# Patient Record
Sex: MALE | URBAN - METROPOLITAN AREA
[De-identification: names, ages, dates, MRNs, and addresses within clinical notes are randomized per-mention and may not be internally consistent; named-entity substitution may affect disease eponyms.]

---

## 2017-08-06 ENCOUNTER — EMERGENCY (EMERGENCY)
Facility: HOSPITAL | Age: 22
LOS: 1 days | Discharge: HOME | End: 2017-08-06
Attending: EMERGENCY MEDICINE
Payer: SELF-PAY

## 2017-08-06 VITALS
HEART RATE: 89 BPM | OXYGEN SATURATION: 98 % | HEIGHT: 66 IN | RESPIRATION RATE: 18 BRPM | DIASTOLIC BLOOD PRESSURE: 78 MMHG | TEMPERATURE: 98 F | SYSTOLIC BLOOD PRESSURE: 123 MMHG | WEIGHT: 149.91 LBS

## 2017-08-06 PROCEDURE — 99053 MED SERV 10PM-8AM 24 HR FAC: CPT

## 2017-08-06 PROCEDURE — 99284 EMERGENCY DEPT VISIT MOD MDM: CPT | Mod: 25
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
## 2017-08-07 VITALS
RESPIRATION RATE: 18 BRPM | DIASTOLIC BLOOD PRESSURE: 71 MMHG | HEART RATE: 81 BPM | SYSTOLIC BLOOD PRESSURE: 127 MMHG | OXYGEN SATURATION: 99 % | TEMPERATURE: 98 F

## 2017-08-07 PROCEDURE — 73030 X-RAY EXAM OF SHOULDER: CPT | Mod: 26,50

## 2017-08-07 PROCEDURE — 73070 X-RAY EXAM OF ELBOW: CPT | Mod: 26,50

## 2017-08-07 RX ORDER — IBUPROFEN 200 MG
600 TABLET ORAL ONCE
Refills: 0 | Status: COMPLETED | OUTPATIENT
Start: 2017-08-07 | End: 2017-08-07

## 2017-08-07 RX ORDER — BACITRACIN ZINC 500 UNIT/G
1 OINTMENT IN PACKET (EA) TOPICAL ONCE
Refills: 0 | Status: COMPLETED | OUTPATIENT
Start: 2017-08-07 | End: 2017-08-07

## 2017-08-07 RX ADMIN — Medication 600 MILLIGRAM(S): at 00:28

## 2017-08-07 RX ADMIN — Medication 1 APPLICATION(S): at 00:28
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
## 2017-11-29 ENCOUNTER — EMERGENCY (EMERGENCY)
Facility: HOSPITAL | Age: 22
LOS: 1 days | Discharge: HOME | End: 2017-11-29
Attending: EMERGENCY MEDICINE | Admitting: EMERGENCY MEDICINE
Payer: SELF-PAY

## 2017-11-29 VITALS
WEIGHT: 149.91 LBS | TEMPERATURE: 98 F | SYSTOLIC BLOOD PRESSURE: 142 MMHG | OXYGEN SATURATION: 99 % | DIASTOLIC BLOOD PRESSURE: 86 MMHG | HEIGHT: 62.99 IN | HEART RATE: 50 BPM | RESPIRATION RATE: 16 BRPM

## 2017-11-29 PROCEDURE — 73630 X-RAY EXAM OF FOOT: CPT | Mod: 26,LT

## 2017-11-29 PROCEDURE — 73630 X-RAY EXAM OF FOOT: CPT

## 2017-11-29 PROCEDURE — 99284 EMERGENCY DEPT VISIT MOD MDM: CPT | Mod: 25

## 2017-11-29 PROCEDURE — 73610 X-RAY EXAM OF ANKLE: CPT | Mod: 26,LT

## 2017-11-29 PROCEDURE — T1013: CPT

## 2017-11-29 PROCEDURE — 29515 APPLICATION SHORT LEG SPLINT: CPT

## 2017-11-29 PROCEDURE — 29515 APPLICATION SHORT LEG SPLINT: CPT | Mod: LT

## 2017-11-29 PROCEDURE — 73700 CT LOWER EXTREMITY W/O DYE: CPT

## 2017-11-29 PROCEDURE — 99285 EMERGENCY DEPT VISIT HI MDM: CPT | Mod: 25

## 2017-11-29 PROCEDURE — 73610 X-RAY EXAM OF ANKLE: CPT

## 2017-11-29 PROCEDURE — 73700 CT LOWER EXTREMITY W/O DYE: CPT | Mod: 26,76,LT

## 2017-11-29 RX ORDER — IBUPROFEN 200 MG
600 TABLET ORAL ONCE
Refills: 0 | Status: COMPLETED | OUTPATIENT
Start: 2017-11-29 | End: 2017-11-29

## 2017-11-29 RX ADMIN — Medication 600 MILLIGRAM(S): at 10:41

## 2017-11-29 NOTE — ED STATDOCS - PROGRESS NOTE DETAILS
Language Services was utilized in obtaining the H & P and throughout the duration of the patient's care.   NP NOTE: Pt seen by intake physician and HPI/ROS/PE/MDM reviewed. Pt seen and evaluated. Discussed plan and any resulted studies at this time. Will continue to monitor and re-evaluate.  Re-Evaluation: pt with persistent left foot pain with difficulty walking. point tenderness to base of great toe and to posterior aspect of foot/ankle. DROM. NVI. xrays neg. discussed splint and ortho fu vs CT with pt, opted for CT, splint, ortho fu. Labs and imaging results reviewed.  Patient advised of results. Anticipatory guidance provided.
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
## 2017-11-29 NOTE — ED STATDOCS - ATTENDING CONTRIBUTION TO CARE
I, Regino Marrufo, performed the initial face to face bedside interview with this patient regarding history of present illness, review of symptoms and relevant past medical, social and family history.  I completed an independent physical examination.  I was the initial provider who evaluated this patient. I have signed out the follow up of any pending tests (i.e. labs, radiological studies) to the ACP.  I have communicated the patient’s plan of care and disposition with the ACP.

## 2017-11-29 NOTE — ED PROCEDURE NOTE - CPROC ED POST PROC CARE GUIDE1
Instructed patient/caregiver regarding signs and symptoms of infection./Verbal/written post procedure instructions were given to patient/caregiver./Instructed patient/caregiver to follow-up with primary care physician./Keep the cast/splint/dressing clean and dry./Elevate the injured extremity as instructed.

## 2017-12-05 PROBLEM — Z00.00 ENCOUNTER FOR PREVENTIVE HEALTH EXAMINATION: Status: ACTIVE | Noted: 2017-12-05

## 2017-12-11 ENCOUNTER — APPOINTMENT (OUTPATIENT)
Dept: ORTHOPEDIC SURGERY | Facility: CLINIC | Age: 22
End: 2017-12-11
Payer: SELF-PAY

## 2017-12-11 VITALS
HEIGHT: 66 IN | TEMPERATURE: 98.7 F | WEIGHT: 160 LBS | BODY MASS INDEX: 25.71 KG/M2 | SYSTOLIC BLOOD PRESSURE: 140 MMHG | HEART RATE: 66 BPM | DIASTOLIC BLOOD PRESSURE: 86 MMHG

## 2017-12-11 DIAGNOSIS — Z78.9 OTHER SPECIFIED HEALTH STATUS: ICD-10-CM

## 2017-12-11 DIAGNOSIS — S93.492A SPRAIN OF OTHER LIGAMENT OF LEFT ANKLE, INITIAL ENCOUNTER: ICD-10-CM

## 2017-12-11 PROCEDURE — 99204 OFFICE O/P NEW MOD 45 MIN: CPT

## 2018-09-13 ENCOUNTER — OTHER (OUTPATIENT)
Age: 23
End: 2018-09-13

## 2019-05-24 ENCOUNTER — EMERGENCY (EMERGENCY)
Facility: HOSPITAL | Age: 24
LOS: 1 days | Discharge: HOME | End: 2019-05-24
Attending: EMERGENCY MEDICINE
Payer: SELF-PAY

## 2019-05-24 VITALS
HEIGHT: 63 IN | HEART RATE: 58 BPM | DIASTOLIC BLOOD PRESSURE: 82 MMHG | TEMPERATURE: 98 F | SYSTOLIC BLOOD PRESSURE: 136 MMHG | RESPIRATION RATE: 18 BRPM | WEIGHT: 149.91 LBS | OXYGEN SATURATION: 99 %

## 2019-05-24 PROCEDURE — 99283 EMERGENCY DEPT VISIT LOW MDM: CPT

## 2019-05-24 PROCEDURE — T1013: CPT

## 2019-05-24 RX ORDER — ERYTHROMYCIN BASE 5 MG/GRAM
1 OINTMENT (GRAM) OPHTHALMIC (EYE)
Qty: 1 | Refills: 0
Start: 2019-05-24 | End: 2019-05-28

## 2019-05-24 RX ORDER — ERYTHROMYCIN BASE 5 MG/GRAM
1 OINTMENT (GRAM) OPHTHALMIC (EYE) ONCE
Refills: 0 | Status: COMPLETED | OUTPATIENT
Start: 2019-05-24 | End: 2019-05-24

## 2019-05-24 RX ADMIN — Medication 1 APPLICATION(S): at 23:23

## 2019-05-24 NOTE — ED PROVIDER NOTE - PHYSICAL EXAMINATION
EYE: 20/20 bilaterally. DUSTIN and eomi bilaterally.   RIGHT EYE: injected sclera and conjunctiva. lid eversion withou fbs noted   RAUL EYE: 20/20 bilaterally. DUSTIN and eomi bilaterally.   RIGHT EYE: injected sclera and conjunctiva. lid eversion withou fbs noted   FLORSCENE: + uptake to 12 region in circular

## 2019-05-24 NOTE — ED PROVIDER NOTE - PROGRESS NOTE DETAILS
patient irrigated at eye wash station educated on ointment and pain drops   educated on fu with eye doctor and NOT SCRATCHING EYE

## 2019-05-24 NOTE — ED PROVIDER NOTE - ATTENDING CONTRIBUTION TO CARE
The patient seen and examined  The patient presents with R eye pain    Cornea Abrasion    I, Evan Ceballos, performed the initial face to face bedside interview with this patient regarding history of present illness, review of symptoms and relevant past medical, social and family history.  I completed an independent physical examination.  I was the initial provider who evaluated this patient. I have signed out the follow up of any pending tests (i.e. labs, radiological studies) to the ACP.  I have communicated the patient’s plan of care and disposition with the ACP.

## 2019-05-24 NOTE — ED PROVIDER NOTE - OBJECTIVE STATEMENT
23 year old male states that he was using a saw to cut plye board today states that a piece flew up and into his right eye. states that he had his brother look for the wood and might have seen a piece states that he has a little blurry vision in the eye. no pain. tetanus was within the last 10 years   no eye glasses

## 2019-12-15 ENCOUNTER — INPATIENT (INPATIENT)
Facility: HOSPITAL | Age: 24
LOS: 4 days | Discharge: NURSING, REHAB OR LONG-TERM CARE | DRG: 24 | End: 2019-12-20
Attending: SURGERY | Admitting: SURGERY
Payer: COMMERCIAL

## 2019-12-15 LAB
ALBUMIN SERPL ELPH-MCNC: 5.1 G/DL — SIGNIFICANT CHANGE UP (ref 3.3–5.2)
ALP SERPL-CCNC: 79 U/L — SIGNIFICANT CHANGE UP (ref 40–120)
ALT FLD-CCNC: 29 U/L — SIGNIFICANT CHANGE UP
ANION GAP SERPL CALC-SCNC: 21 MMOL/L — HIGH (ref 5–17)
APTT BLD: 25.6 SEC — LOW (ref 27.5–36.3)
AST SERPL-CCNC: 36 U/L — SIGNIFICANT CHANGE UP
BASE EXCESS BLDV CALC-SCNC: -7.3 MMOL/L — LOW (ref -2–2)
BASOPHILS # BLD AUTO: 0.07 K/UL — SIGNIFICANT CHANGE UP (ref 0–0.2)
BASOPHILS NFR BLD AUTO: 0.8 % — SIGNIFICANT CHANGE UP (ref 0–2)
BILIRUB SERPL-MCNC: 0.3 MG/DL — LOW (ref 0.4–2)
BLD GP AB SCN SERPL QL: SIGNIFICANT CHANGE UP
BUN SERPL-MCNC: 15 MG/DL — SIGNIFICANT CHANGE UP (ref 8–20)
CA-I SERPL-SCNC: 1.06 MMOL/L — LOW (ref 1.15–1.33)
CALCIUM SERPL-MCNC: 8.9 MG/DL — SIGNIFICANT CHANGE UP (ref 8.6–10.2)
CHLORIDE BLDV-SCNC: 111 MMOL/L — HIGH (ref 98–107)
CHLORIDE SERPL-SCNC: 105 MMOL/L — SIGNIFICANT CHANGE UP (ref 98–107)
CO2 SERPL-SCNC: 16 MMOL/L — LOW (ref 22–29)
CREAT SERPL-MCNC: 0.94 MG/DL — SIGNIFICANT CHANGE UP (ref 0.5–1.3)
EOSINOPHIL # BLD AUTO: 0.07 K/UL — SIGNIFICANT CHANGE UP (ref 0–0.5)
EOSINOPHIL NFR BLD AUTO: 0.8 % — SIGNIFICANT CHANGE UP (ref 0–6)
ETHANOL SERPL-MCNC: 165 MG/DL — SIGNIFICANT CHANGE UP
GAS PNL BLDV: 146 MMOL/L — HIGH (ref 135–145)
GAS PNL BLDV: SIGNIFICANT CHANGE UP
GAS PNL BLDV: SIGNIFICANT CHANGE UP
GLUCOSE BLDV-MCNC: 95 MG/DL — SIGNIFICANT CHANGE UP (ref 70–99)
GLUCOSE SERPL-MCNC: 104 MG/DL — SIGNIFICANT CHANGE UP (ref 70–115)
HCO3 BLDV-SCNC: 19 MMOL/L — LOW (ref 20–26)
HCT VFR BLD CALC: 51.5 % — HIGH (ref 39–50)
HCT VFR BLDA CALC: 60 — HIGH (ref 39–50)
HGB BLD CALC-MCNC: 19.4 G/DL — CRITICAL HIGH (ref 13–17)
HGB BLD-MCNC: 18.3 G/DL — HIGH (ref 13–17)
IMM GRANULOCYTES NFR BLD AUTO: 0.8 % — SIGNIFICANT CHANGE UP (ref 0–1.5)
INR BLD: 0.92 RATIO — SIGNIFICANT CHANGE UP (ref 0.88–1.16)
LACTATE BLDV-MCNC: 4.5 MMOL/L — CRITICAL HIGH (ref 0.5–2)
LYMPHOCYTES # BLD AUTO: 2.83 K/UL — SIGNIFICANT CHANGE UP (ref 1–3.3)
LYMPHOCYTES # BLD AUTO: 32.5 % — SIGNIFICANT CHANGE UP (ref 13–44)
MCHC RBC-ENTMCNC: 31.3 PG — SIGNIFICANT CHANGE UP (ref 27–34)
MCHC RBC-ENTMCNC: 35.5 GM/DL — SIGNIFICANT CHANGE UP (ref 32–36)
MCV RBC AUTO: 88.2 FL — SIGNIFICANT CHANGE UP (ref 80–100)
MONOCYTES # BLD AUTO: 0.36 K/UL — SIGNIFICANT CHANGE UP (ref 0–0.9)
MONOCYTES NFR BLD AUTO: 4.1 % — SIGNIFICANT CHANGE UP (ref 2–14)
NEUTROPHILS # BLD AUTO: 5.32 K/UL — SIGNIFICANT CHANGE UP (ref 1.8–7.4)
NEUTROPHILS NFR BLD AUTO: 61 % — SIGNIFICANT CHANGE UP (ref 43–77)
OTHER CELLS CSF MANUAL: 25 ML/DL — HIGH (ref 18–22)
PCO2 BLDV: 37 MMHG — SIGNIFICANT CHANGE UP (ref 35–50)
PH BLDV: 7.3 — LOW (ref 7.32–7.43)
PLATELET # BLD AUTO: 277 K/UL — SIGNIFICANT CHANGE UP (ref 150–400)
PO2 BLDV: 72 MMHG — HIGH (ref 25–45)
POTASSIUM BLDV-SCNC: 3.9 MMOL/L — SIGNIFICANT CHANGE UP (ref 3.4–4.5)
POTASSIUM SERPL-MCNC: 4.1 MMOL/L — SIGNIFICANT CHANGE UP (ref 3.5–5.3)
POTASSIUM SERPL-SCNC: 4.1 MMOL/L — SIGNIFICANT CHANGE UP (ref 3.5–5.3)
PROT SERPL-MCNC: 8.1 G/DL — SIGNIFICANT CHANGE UP (ref 6.6–8.7)
PROTHROM AB SERPL-ACNC: 10.6 SEC — SIGNIFICANT CHANGE UP (ref 10–12.9)
RBC # BLD: 5.84 M/UL — HIGH (ref 4.2–5.8)
RBC # FLD: 11.6 % — SIGNIFICANT CHANGE UP (ref 10.3–14.5)
SAO2 % BLDV: 93 % — SIGNIFICANT CHANGE UP
SODIUM SERPL-SCNC: 142 MMOL/L — SIGNIFICANT CHANGE UP (ref 135–145)
WBC # BLD: 8.72 K/UL — SIGNIFICANT CHANGE UP (ref 3.8–10.5)
WBC # FLD AUTO: 8.72 K/UL — SIGNIFICANT CHANGE UP (ref 3.8–10.5)

## 2019-12-15 PROCEDURE — 72131 CT LUMBAR SPINE W/O DYE: CPT | Mod: 26

## 2019-12-15 PROCEDURE — 99222 1ST HOSP IP/OBS MODERATE 55: CPT

## 2019-12-15 PROCEDURE — 74177 CT ABD & PELVIS W/CONTRAST: CPT | Mod: 26

## 2019-12-15 PROCEDURE — 72128 CT CHEST SPINE W/O DYE: CPT | Mod: 26

## 2019-12-15 PROCEDURE — 71045 X-RAY EXAM CHEST 1 VIEW: CPT | Mod: 26

## 2019-12-15 PROCEDURE — 71260 CT THORAX DX C+: CPT | Mod: 26

## 2019-12-15 PROCEDURE — 99285 EMERGENCY DEPT VISIT HI MDM: CPT

## 2019-12-15 PROCEDURE — 70486 CT MAXILLOFACIAL W/O DYE: CPT | Mod: 26

## 2019-12-15 PROCEDURE — 70450 CT HEAD/BRAIN W/O DYE: CPT | Mod: 26

## 2019-12-15 PROCEDURE — 70496 CT ANGIOGRAPHY HEAD: CPT | Mod: 26

## 2019-12-15 PROCEDURE — 72125 CT NECK SPINE W/O DYE: CPT | Mod: 26

## 2019-12-15 PROCEDURE — 73090 X-RAY EXAM OF FOREARM: CPT | Mod: 26,RT

## 2019-12-15 RX ORDER — SODIUM CHLORIDE 9 MG/ML
1000 INJECTION INTRAMUSCULAR; INTRAVENOUS; SUBCUTANEOUS
Refills: 0 | Status: DISCONTINUED | OUTPATIENT
Start: 2019-12-15 | End: 2019-12-16

## 2019-12-15 RX ORDER — LEVETIRACETAM 250 MG/1
1000 TABLET, FILM COATED ORAL ONCE
Refills: 0 | Status: COMPLETED | OUTPATIENT
Start: 2019-12-15 | End: 2019-12-15

## 2019-12-15 RX ORDER — HYDROMORPHONE HYDROCHLORIDE 2 MG/ML
0.5 INJECTION INTRAMUSCULAR; INTRAVENOUS; SUBCUTANEOUS EVERY 4 HOURS
Refills: 0 | Status: DISCONTINUED | OUTPATIENT
Start: 2019-12-15 | End: 2019-12-16

## 2019-12-15 RX ORDER — FENTANYL CITRATE 50 UG/ML
50 INJECTION INTRAVENOUS ONCE
Refills: 0 | Status: DISCONTINUED | OUTPATIENT
Start: 2019-12-15 | End: 2019-12-15

## 2019-12-15 RX ORDER — TETANUS TOXOID, REDUCED DIPHTHERIA TOXOID AND ACELLULAR PERTUSSIS VACCINE, ADSORBED 5; 2.5; 8; 8; 2.5 [IU]/.5ML; [IU]/.5ML; UG/.5ML; UG/.5ML; UG/.5ML
0.5 SUSPENSION INTRAMUSCULAR ONCE
Refills: 0 | Status: COMPLETED | OUTPATIENT
Start: 2019-12-15 | End: 2019-12-15

## 2019-12-15 RX ORDER — ACETAMINOPHEN 500 MG
1000 TABLET ORAL ONCE
Refills: 0 | Status: COMPLETED | OUTPATIENT
Start: 2019-12-15 | End: 2019-12-15

## 2019-12-15 RX ORDER — CEFAZOLIN SODIUM 1 G
VIAL (EA) INJECTION
Refills: 0 | Status: DISCONTINUED | OUTPATIENT
Start: 2019-12-15 | End: 2019-12-16

## 2019-12-15 RX ORDER — CALCIUM GLUCONATE 100 MG/ML
1 VIAL (ML) INTRAVENOUS ONCE
Refills: 0 | Status: COMPLETED | OUTPATIENT
Start: 2019-12-15 | End: 2019-12-15

## 2019-12-15 RX ORDER — HYDROMORPHONE HYDROCHLORIDE 2 MG/ML
1 INJECTION INTRAMUSCULAR; INTRAVENOUS; SUBCUTANEOUS EVERY 4 HOURS
Refills: 0 | Status: DISCONTINUED | OUTPATIENT
Start: 2019-12-15 | End: 2019-12-16

## 2019-12-15 RX ORDER — CEFAZOLIN SODIUM 1 G
1000 VIAL (EA) INJECTION ONCE
Refills: 0 | Status: DISCONTINUED | OUTPATIENT
Start: 2019-12-15 | End: 2019-12-16

## 2019-12-15 RX ORDER — CHLORHEXIDINE GLUCONATE 213 G/1000ML
1 SOLUTION TOPICAL
Refills: 0 | Status: DISCONTINUED | OUTPATIENT
Start: 2019-12-15 | End: 2019-12-16

## 2019-12-15 RX ORDER — CEFAZOLIN SODIUM 1 G
2000 VIAL (EA) INJECTION ONCE
Refills: 0 | Status: DISCONTINUED | OUTPATIENT
Start: 2019-12-15 | End: 2019-12-15

## 2019-12-15 RX ORDER — LEVETIRACETAM 250 MG/1
500 TABLET, FILM COATED ORAL EVERY 12 HOURS
Refills: 0 | Status: DISCONTINUED | OUTPATIENT
Start: 2019-12-15 | End: 2019-12-16

## 2019-12-15 RX ADMIN — SODIUM CHLORIDE 1000 MILLILITER(S): 9 INJECTION INTRAMUSCULAR; INTRAVENOUS; SUBCUTANEOUS at 23:30

## 2019-12-15 RX ADMIN — HYDROMORPHONE HYDROCHLORIDE 1 MILLIGRAM(S): 2 INJECTION INTRAMUSCULAR; INTRAVENOUS; SUBCUTANEOUS at 23:30

## 2019-12-15 RX ADMIN — TETANUS TOXOID, REDUCED DIPHTHERIA TOXOID AND ACELLULAR PERTUSSIS VACCINE, ADSORBED 0.5 MILLILITER(S): 5; 2.5; 8; 8; 2.5 SUSPENSION INTRAMUSCULAR at 23:35

## 2019-12-15 RX ADMIN — HYDROMORPHONE HYDROCHLORIDE 1 MILLIGRAM(S): 2 INJECTION INTRAMUSCULAR; INTRAVENOUS; SUBCUTANEOUS at 23:45

## 2019-12-15 RX ADMIN — FENTANYL CITRATE 50 MICROGRAM(S): 50 INJECTION INTRAVENOUS at 23:00

## 2019-12-15 RX ADMIN — Medication 100 MILLIGRAM(S): at 23:30

## 2019-12-15 RX ADMIN — FENTANYL CITRATE 50 MICROGRAM(S): 50 INJECTION INTRAVENOUS at 22:45

## 2019-12-15 NOTE — H&P ADULT - ASSESSMENT
-Keep c-spine collar in place at this time  -NPO w/ IVF  -F/u pending labs and imaging  - Multiple fractures noted to the cephalad, depressed skull fracture  - Neurosurgery evaluation  - Admit to sicu  - Plastics for nasal bone fractures  - obtain plainfilms of right upper and lower extremities
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
## 2019-12-15 NOTE — ED PROVIDER NOTE - SKIN, MLM
3cm laceration to parietal scalp, 3cm to R occipital lobe. abrasion to dorsal aspect of R index finger. laceration to L ear lobe. abrasions to R knee and lateral aspect of L leg

## 2019-12-15 NOTE — H&P ADULT - NSHPPHYSICALEXAM_GEN_ALL_CORE
Constitutional: Well-developed well nourished Male in no acute distress  HEENT: Head with blood from head. 3 cm laceration to the midline parietal region, 4cm laceration to right occipital region, maxillofacial structures stable, no blood or discharge from nares or oral cavity, no gray sign / racoon eyes, EOMI b/l, pupils [ 3]mm round and reactive to light b/l, no active drainage or redness, left ear with .7 cm laceration  Neck: cervical collar in place, trachea midline  Respiratory: Breath sounds CTA b/l respirations are unlabored, no accessory muscle use, no conversational dyspnea  Cardiovascular: Regular rate & rhythm, +S1, S1, Chest wall is non-tender to palpation, no subQ emphysema or crepitus palpated  Gastrointestinal: Abdomen soft, non-tender, non-distended, no rebound tenderness / guarding, no ecchymosis or external signs of abdominal trauma  Musculoskeletal: Unable to move the distal aspect of his right leg, can flex at the level of the knee. no point tenderness or deformity noted to lower extremities b/l. Right upper extremity no gross deformities. 3cm laceration to volar aspect of forearm, 4x5cm hematoma with overlaying abrasion to the ventral aspect of left forearm. 2 areas of .7cm abrasion to palm of left hand. Abrasion to right knee  Pelvis: stable  Vascular: 2+ radial, femoral, and DP pulses b/l  Neurological: GCS: 15 (4/5/6). A&O x 3; no movement right lower extremity distal to the knee.  Musculoskeletal: 5/5 strength of upper and lower extremities b/l  Neuropsinal: no C/LS spine tenderness to palpation, Tender to mid back/distal thoracic region to palpation. no step-offs or signs of external trauma to the back
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
## 2019-12-15 NOTE — H&P ADULT - PROBLEM SELECTOR PLAN 4
Admit to SICU under Trauma service  Neuro checks  Neurosurgery consultation   pain control  IV antibiotics

## 2019-12-15 NOTE — H&P ADULT - PROBLEM SELECTOR PLAN 1
Admit to SICU under Trauma service  Neuro checks  Neurosurgery consultation   pain control  IV antibiotics  Plastic surgery consult
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
## 2019-12-16 ENCOUNTER — RESULT REVIEW (OUTPATIENT)
Age: 24
End: 2019-12-16

## 2019-12-16 VITALS
DIASTOLIC BLOOD PRESSURE: 94 MMHG | OXYGEN SATURATION: 98 % | TEMPERATURE: 99 F | HEART RATE: 95 BPM | RESPIRATION RATE: 20 BRPM | SYSTOLIC BLOOD PRESSURE: 138 MMHG

## 2019-12-16 DIAGNOSIS — S02.91XA UNSPECIFIED FRACTURE OF SKULL, INITIAL ENCOUNTER FOR CLOSED FRACTURE: ICD-10-CM

## 2019-12-16 DIAGNOSIS — S02.2XXA FRACTURE OF NASAL BONES, INITIAL ENCOUNTER FOR CLOSED FRACTURE: ICD-10-CM

## 2019-12-16 DIAGNOSIS — R29.898 OTHER SYMPTOMS AND SIGNS INVOLVING THE MUSCULOSKELETAL SYSTEM: ICD-10-CM
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
## 2019-12-16 DIAGNOSIS — S51.811A LACERATION WITHOUT FOREIGN BODY OF RIGHT FOREARM, INITIAL ENCOUNTER: ICD-10-CM

## 2019-12-16 DIAGNOSIS — S06.5X9A TRAUMATIC SUBDURAL HEMORRHAGE WITH LOSS OF CONSCIOUSNESS OF UNSPECIFIED DURATION, INITIAL ENCOUNTER: ICD-10-CM

## 2019-12-16 DIAGNOSIS — I60.9 NONTRAUMATIC SUBARACHNOID HEMORRHAGE, UNSPECIFIED: ICD-10-CM

## 2019-12-16 DIAGNOSIS — S02.91XB UNSPECIFIED FRACTURE OF SKULL, INITIAL ENCOUNTER FOR OPEN FRACTURE: ICD-10-CM

## 2019-12-16 LAB
ABO RH CONFIRMATION: SIGNIFICANT CHANGE UP
AMPHET UR-MCNC: NEGATIVE — SIGNIFICANT CHANGE UP
ANION GAP SERPL CALC-SCNC: 14 MMOL/L — SIGNIFICANT CHANGE UP (ref 5–17)
BARBITURATES UR SCN-MCNC: NEGATIVE — SIGNIFICANT CHANGE UP
BASOPHILS # BLD AUTO: 0.03 K/UL — SIGNIFICANT CHANGE UP (ref 0–0.2)
BASOPHILS NFR BLD AUTO: 0.3 % — SIGNIFICANT CHANGE UP (ref 0–2)
BENZODIAZ UR-MCNC: NEGATIVE — SIGNIFICANT CHANGE UP
BUN SERPL-MCNC: 9 MG/DL — SIGNIFICANT CHANGE UP (ref 8–20)
CALCIUM SERPL-MCNC: 8.6 MG/DL — SIGNIFICANT CHANGE UP (ref 8.6–10.2)
CHLORIDE SERPL-SCNC: 103 MMOL/L — SIGNIFICANT CHANGE UP (ref 98–107)
CO2 SERPL-SCNC: 21 MMOL/L — LOW (ref 22–29)
COCAINE METAB.OTHER UR-MCNC: NEGATIVE — SIGNIFICANT CHANGE UP
CREAT SERPL-MCNC: 0.62 MG/DL — SIGNIFICANT CHANGE UP (ref 0.5–1.3)
EOSINOPHIL # BLD AUTO: 0.02 K/UL — SIGNIFICANT CHANGE UP (ref 0–0.5)
EOSINOPHIL NFR BLD AUTO: 0.2 % — SIGNIFICANT CHANGE UP (ref 0–6)
GLUCOSE BLDC GLUCOMTR-MCNC: 87 MG/DL — SIGNIFICANT CHANGE UP (ref 70–99)
GLUCOSE BLDC GLUCOMTR-MCNC: 97 MG/DL — SIGNIFICANT CHANGE UP (ref 70–99)
GLUCOSE SERPL-MCNC: 99 MG/DL — SIGNIFICANT CHANGE UP (ref 70–115)
GRAM STN FLD: SIGNIFICANT CHANGE UP
HCT VFR BLD CALC: 45.4 % — SIGNIFICANT CHANGE UP (ref 39–50)
HGB BLD-MCNC: 16 G/DL — SIGNIFICANT CHANGE UP (ref 13–17)
IMM GRANULOCYTES NFR BLD AUTO: 0.3 % — SIGNIFICANT CHANGE UP (ref 0–1.5)
LACTATE SERPL-SCNC: 0.8 MMOL/L — SIGNIFICANT CHANGE UP (ref 0.5–2)
LYMPHOCYTES # BLD AUTO: 1.16 K/UL — SIGNIFICANT CHANGE UP (ref 1–3.3)
LYMPHOCYTES # BLD AUTO: 10.2 % — LOW (ref 13–44)
MAGNESIUM SERPL-MCNC: 2 MG/DL — SIGNIFICANT CHANGE UP (ref 1.6–2.6)
MCHC RBC-ENTMCNC: 31.1 PG — SIGNIFICANT CHANGE UP (ref 27–34)
MCHC RBC-ENTMCNC: 35.2 GM/DL — SIGNIFICANT CHANGE UP (ref 32–36)
MCV RBC AUTO: 88.3 FL — SIGNIFICANT CHANGE UP (ref 80–100)
METHADONE UR-MCNC: NEGATIVE — SIGNIFICANT CHANGE UP
MONOCYTES # BLD AUTO: 0.67 K/UL — SIGNIFICANT CHANGE UP (ref 0–0.9)
MONOCYTES NFR BLD AUTO: 5.9 % — SIGNIFICANT CHANGE UP (ref 2–14)
NEUTROPHILS # BLD AUTO: 9.48 K/UL — HIGH (ref 1.8–7.4)
NEUTROPHILS NFR BLD AUTO: 83.1 % — HIGH (ref 43–77)
OPIATES UR-MCNC: NEGATIVE — SIGNIFICANT CHANGE UP
PCP SPEC-MCNC: SIGNIFICANT CHANGE UP
PCP UR-MCNC: NEGATIVE — SIGNIFICANT CHANGE UP
PHOSPHATE SERPL-MCNC: 2.9 MG/DL — SIGNIFICANT CHANGE UP (ref 2.4–4.7)
PLATELET # BLD AUTO: 219 K/UL — SIGNIFICANT CHANGE UP (ref 150–400)
POTASSIUM SERPL-MCNC: 4.2 MMOL/L — SIGNIFICANT CHANGE UP (ref 3.5–5.3)
POTASSIUM SERPL-SCNC: 4.2 MMOL/L — SIGNIFICANT CHANGE UP (ref 3.5–5.3)
RBC # BLD: 5.14 M/UL — SIGNIFICANT CHANGE UP (ref 4.2–5.8)
RBC # FLD: 11.7 % — SIGNIFICANT CHANGE UP (ref 10.3–14.5)
SODIUM SERPL-SCNC: 138 MMOL/L — SIGNIFICANT CHANGE UP (ref 135–145)
SPECIMEN SOURCE: SIGNIFICANT CHANGE UP
THC UR QL: NEGATIVE — SIGNIFICANT CHANGE UP
WBC # BLD: 11.39 K/UL — HIGH (ref 3.8–10.5)
WBC # FLD AUTO: 11.39 K/UL — HIGH (ref 3.8–10.5)

## 2019-12-16 PROCEDURE — 99291 CRITICAL CARE FIRST HOUR: CPT | Mod: 25

## 2019-12-16 PROCEDURE — 88311 DECALCIFY TISSUE: CPT | Mod: 26

## 2019-12-16 PROCEDURE — 93010 ELECTROCARDIOGRAM REPORT: CPT

## 2019-12-16 PROCEDURE — 70450 CT HEAD/BRAIN W/O DYE: CPT | Mod: 26

## 2019-12-16 PROCEDURE — 99252 IP/OBS CONSLTJ NEW/EST SF 35: CPT

## 2019-12-16 PROCEDURE — 99232 SBSQ HOSP IP/OBS MODERATE 35: CPT

## 2019-12-16 PROCEDURE — 62140 CRNOP SKULL DEFECT<5 CM DIAM: CPT | Mod: 80

## 2019-12-16 PROCEDURE — 73110 X-RAY EXAM OF WRIST: CPT | Mod: 26,RT

## 2019-12-16 PROCEDURE — 88304 TISSUE EXAM BY PATHOLOGIST: CPT | Mod: 26

## 2019-12-16 PROCEDURE — 71045 X-RAY EXAM CHEST 1 VIEW: CPT | Mod: 26

## 2019-12-16 PROCEDURE — 62005 TREAT SKULL FRACTURE: CPT

## 2019-12-16 PROCEDURE — 62005 TREAT SKULL FRACTURE: CPT | Mod: 80

## 2019-12-16 PROCEDURE — 62140 CRNOP SKULL DEFECT<5 CM DIAM: CPT

## 2019-12-16 PROCEDURE — 36558 INSERT TUNNELED CV CATH: CPT

## 2019-12-16 RX ORDER — SODIUM CHLORIDE 9 MG/ML
1000 INJECTION, SOLUTION INTRAVENOUS ONCE
Refills: 0 | Status: COMPLETED | OUTPATIENT
Start: 2019-12-16 | End: 2019-12-16

## 2019-12-16 RX ORDER — CEFAZOLIN SODIUM 1 G
2000 VIAL (EA) INJECTION ONCE
Refills: 0 | Status: COMPLETED | OUTPATIENT
Start: 2019-12-16 | End: 2019-12-15

## 2019-12-16 RX ORDER — FENTANYL CITRATE 50 UG/ML
12.5 INJECTION INTRAVENOUS ONCE
Refills: 0 | Status: DISCONTINUED | OUTPATIENT
Start: 2019-12-16 | End: 2019-12-16

## 2019-12-16 RX ORDER — CEFAZOLIN SODIUM 1 G
1000 VIAL (EA) INJECTION EVERY 8 HOURS
Refills: 0 | Status: DISCONTINUED | OUTPATIENT
Start: 2019-12-16 | End: 2019-12-16

## 2019-12-16 RX ORDER — ACETAMINOPHEN 500 MG
650 TABLET ORAL EVERY 6 HOURS
Refills: 0 | Status: DISCONTINUED | OUTPATIENT
Start: 2019-12-16 | End: 2019-12-17

## 2019-12-16 RX ORDER — SODIUM CHLORIDE 9 MG/ML
1000 INJECTION INTRAMUSCULAR; INTRAVENOUS; SUBCUTANEOUS ONCE
Refills: 0 | Status: COMPLETED | OUTPATIENT
Start: 2019-12-16 | End: 2019-12-15

## 2019-12-16 RX ORDER — SODIUM CHLORIDE 9 MG/ML
1000 INJECTION INTRAMUSCULAR; INTRAVENOUS; SUBCUTANEOUS
Refills: 0 | Status: DISCONTINUED | OUTPATIENT
Start: 2019-12-16 | End: 2019-12-17

## 2019-12-16 RX ORDER — CEFAZOLIN SODIUM 1 G
1000 VIAL (EA) INJECTION EVERY 8 HOURS
Refills: 0 | Status: DISCONTINUED | OUTPATIENT
Start: 2019-12-16 | End: 2019-12-17

## 2019-12-16 RX ORDER — BACITRACIN ZINC 500 UNIT/G
1 OINTMENT IN PACKET (EA) TOPICAL DAILY
Refills: 0 | Status: DISCONTINUED | OUTPATIENT
Start: 2019-12-16 | End: 2019-12-16

## 2019-12-16 RX ORDER — HYDROMORPHONE HYDROCHLORIDE 2 MG/ML
0.5 INJECTION INTRAMUSCULAR; INTRAVENOUS; SUBCUTANEOUS ONCE
Refills: 0 | Status: DISCONTINUED | OUTPATIENT
Start: 2019-12-16 | End: 2019-12-16

## 2019-12-16 RX ORDER — SODIUM CHLORIDE 9 MG/ML
1000 INJECTION, SOLUTION INTRAVENOUS
Refills: 0 | Status: DISCONTINUED | OUTPATIENT
Start: 2019-12-16 | End: 2019-12-16

## 2019-12-16 RX ORDER — LEVETIRACETAM 250 MG/1
500 TABLET, FILM COATED ORAL
Refills: 0 | Status: DISCONTINUED | OUTPATIENT
Start: 2019-12-16 | End: 2019-12-18

## 2019-12-16 RX ADMIN — CHLORHEXIDINE GLUCONATE 1 APPLICATION(S): 213 SOLUTION TOPICAL at 07:01

## 2019-12-16 RX ADMIN — Medication 100 GRAM(S): at 01:31

## 2019-12-16 RX ADMIN — LEVETIRACETAM 420 MILLIGRAM(S): 250 TABLET, FILM COATED ORAL at 08:50

## 2019-12-16 RX ADMIN — SODIUM CHLORIDE 150 MILLILITER(S): 9 INJECTION INTRAMUSCULAR; INTRAVENOUS; SUBCUTANEOUS at 08:50

## 2019-12-16 RX ADMIN — Medication 100 MILLIGRAM(S): at 16:15

## 2019-12-16 RX ADMIN — Medication 1000 MILLIGRAM(S): at 01:05

## 2019-12-16 RX ADMIN — HYDROMORPHONE HYDROCHLORIDE 0.5 MILLIGRAM(S): 2 INJECTION INTRAMUSCULAR; INTRAVENOUS; SUBCUTANEOUS at 07:46

## 2019-12-16 RX ADMIN — FENTANYL CITRATE 12.5 MICROGRAM(S): 50 INJECTION INTRAVENOUS at 14:48

## 2019-12-16 RX ADMIN — HYDROMORPHONE HYDROCHLORIDE 0.5 MILLIGRAM(S): 2 INJECTION INTRAMUSCULAR; INTRAVENOUS; SUBCUTANEOUS at 08:11

## 2019-12-16 RX ADMIN — LEVETIRACETAM 400 MILLIGRAM(S): 250 TABLET, FILM COATED ORAL at 01:21

## 2019-12-16 RX ADMIN — Medication 100 MILLIGRAM(S): at 09:13

## 2019-12-16 RX ADMIN — Medication 100 MILLIGRAM(S): at 23:31

## 2019-12-16 RX ADMIN — SODIUM CHLORIDE 1000 MILLILITER(S): 9 INJECTION, SOLUTION INTRAVENOUS at 03:40

## 2019-12-16 RX ADMIN — HYDROMORPHONE HYDROCHLORIDE 0.5 MILLIGRAM(S): 2 INJECTION INTRAMUSCULAR; INTRAVENOUS; SUBCUTANEOUS at 20:54

## 2019-12-16 RX ADMIN — FENTANYL CITRATE 12.5 MICROGRAM(S): 50 INJECTION INTRAVENOUS at 15:00

## 2019-12-16 RX ADMIN — Medication 400 MILLIGRAM(S): at 00:50

## 2019-12-16 RX ADMIN — HYDROMORPHONE HYDROCHLORIDE 0.5 MILLIGRAM(S): 2 INJECTION INTRAMUSCULAR; INTRAVENOUS; SUBCUTANEOUS at 23:50

## 2019-12-16 RX ADMIN — HYDROMORPHONE HYDROCHLORIDE 0.5 MILLIGRAM(S): 2 INJECTION INTRAMUSCULAR; INTRAVENOUS; SUBCUTANEOUS at 22:22

## 2019-12-16 RX ADMIN — HYDROMORPHONE HYDROCHLORIDE 0.5 MILLIGRAM(S): 2 INJECTION INTRAMUSCULAR; INTRAVENOUS; SUBCUTANEOUS at 23:31

## 2019-12-16 NOTE — PROGRESS NOTE ADULT - SUBJECTIVE AND OBJECTIVE BOX
Neurosurgery   Pre-Procedure Note PA-C      Procedure: This is a 24 year old right hand dominant Male for repair of depressed skull fracture with Dr. Velez today.   Anesthesia plan- general  Abx- on ancef  NPO  labs reviewed see below  Imaging reviewed see below  Type and screen valid  Consent to be obtained by surgeon      LABS                 18.3   8.72   )----------(  277       ( 15 Dec 2019 22:44 )               51.5     PT/INR -  10.6 sec / 0.92 ratio   ( 15 Dec 2019 22:44 )     PTT -  25.6 sec   ( 15 Dec 2019 22:44 )  CAPILLARY BLOOD GLUCOSE    Type + Screen (12.15.19 @ 22:52)    ABO RH Interpretation: A POS        22:43 - VBG - pH: 7.30  | pCO2: 37    | pO2: 72    | Lactate: 4.5      < from: CT Chest w/ IV Cont (12.15.19 @ 23:00) >  IMPRESSION:     No CT evidence of acute traumatic injury to the chest, abdomen, or pelvis.    No acute thoracic or lumbar spine fracture or evidence of traumatic   malalignment.    < end of copied text >  < from: CT Angio Head w/ IV Cont (12.15.19 @ 23:47) >  Brain: Extra-axial hemorrhage overlying the superior parasagittal left  parietal   lobe measuring up to approximately 8 mm in depth, no apparent active   extravasation of contrast to suggest active hemorrhaging. Small amount of   subdural hemorrhage along left lateral margin of the anterior to mid   falx. Mild   pneumocephalus.   Ventricles: Mild inferior displacement of the posterior body of the left   lateral ventricle and left ventricular atrium.   Bones/joints: Superior left parasagittal comminuted calvarial fracture   with up   to approximately 9 mm bony depression. Bony fragments approximate the   left   lateral margin of the superior sagittal sinus and impresses the medial   extent   of several superficial cerebral veins. Right and left lateral nasal wall   fractures, fracture of the left maxillary frontal process and diastasis   at the   right nasal maxillary suture.   Sinuses: Mild opacification of ethmoid air cells.   Soft tissues: Superior scalp swelling and bilateral scalp staples.     < end of copied text >

## 2019-12-16 NOTE — CHART NOTE - NSCHARTNOTEFT_GEN_A_CORE
I reviewed ct scan of the cervical spine, was negative for any injury. I was able to clear the c-spine with the help of  Odette. He was able to cooperate with all my instructions and had no pain with movement of his neck. He remains a GCS 15, A&Ox3, and currently his pain is controlled. I also reassured him that we are here to take care of him and that he is in a safe place. His brother was present at the bedside, security checked his ID to make sure that his name matched what the patient provided to the .

## 2019-12-16 NOTE — PROGRESS NOTE ADULT - SUBJECTIVE AND OBJECTIVE BOX
24 HOUR EVENTS:  Patient admitted to SICU overnight, still awaiting ICU bed. Patient remained neurologically intact.    SUBJECTIVE/ROS:  [x] A ten-point review of systems was otherwise negative except as noted.  [ ] Due to altered mental status/intubation, subjective information were not able to be obtained from the patient. History was obtained, to the extent possible, from review of the chart and collateral sources of information.      NEURO  Exam: GCS 15  Meds: HYDROmorphone  Injectable 0.5 milliGRAM(s) IV Push every 4 hours PRN Moderate Pain (4 - 6)  HYDROmorphone  Injectable 1 milliGRAM(s) IV Push every 4 hours PRN Severe Pain (7 - 10)  levETIRAcetam  IVPB 500 milliGRAM(s) IV Intermittent every 12 hours    [x] Adequacy of sedation and pain control has been assessed and adjusted      RESPIRATORY  RR: 20 (12-16-19 @ 02:15) (20 - 20)  SpO2: 98% (12-16-19 @ 02:15) (98% - 98%)  Wt(kg): --  Exam: unlabored, clear to auscultation bilaterally  Mechanical Ventilation:     [ ] Extubation Readiness Assessed  Meds:       CARDIOVASCULAR  HR: 95 (12-16-19 @ 02:15) (95 - 95)  BP: 138/94 (12-16-19 @ 02:15) (138/94 - 138/94)  BP(mean): --  ABP: --  ABP(mean): --  Wt(kg): --  CVP(cm H2O): --  VBG - ( 15 Dec 2019 22:43 )  pH: 7.30  /  pCO2: 37    /  pO2: 72    / HCO3: 19    / Base Excess: -7.3  /  SaO2: 93     Lactate: 4.5                Exam:  Cardiac Rhythm: NSR  Perfusion     [x]Adequate   [ ]Inadequate  Mentation   [c]Normal       [ ]Reduced  Extremities  [c]Warm         [ ]Cool  Meds:       GI/NUTRITION  Exam: abd soft  Diet: NPO  Meds:     GENITOURINARY  I&O's Detail      12-15    142  |  105  |  15.0  ----------------------------<  104  4.1   |  16.0<L>  |  0.94    Ca    8.9      15 Dec 2019 22:44    TPro  8.1  /  Alb  5.1  /  TBili  0.3<L>  /  DBili  x   /  AST  36  /  ALT  29  /  AlkPhos  79  12-15    [ ] Irizarry catheter, indication: N/A  Meds: sodium chloride 0.9%. 1000 milliLiter(s) IV Continuous <Continuous>        HEMATOLOGIC  Meds:   [x] VTE Prophylaxis                        18.3   8.72  )-----------( 277      ( 15 Dec 2019 22:44 )             51.5     PT/INR - ( 15 Dec 2019 22:44 )   PT: 10.6 sec;   INR: 0.92 ratio         PTT - ( 15 Dec 2019 22:44 )  PTT:25.6 sec      INFECTIOUS DISEASES  T(C): 37.1 (12-16-19 @ 02:15), Max: 37.1 (12-16-19 @ 02:15)  Wt(kg): --  WBC Count: 8.72 K/uL (12-15 @ 22:44)    Recent Cultures:    Meds: ceFAZolin   IVPB 1000 milliGRAM(s) IV Intermittent every 8 hours        ENDOCRINE  Capillary Blood Glucose    Meds:       ACCESS DEVICES:  [x] Peripheral IV      OTHER MEDICATIONS:  BACItracin   Ointment 1 Application(s) Topical daily  chlorhexidine 2% Cloths 1 Application(s) Topical <User Schedule>      CODE STATUS: Full code
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
## 2019-12-16 NOTE — PROCEDURE NOTE - NSLAC2DEPTH_SKIN_A_CORE
----- Message from Merlyn Haynes sent at 6/14/2017 11:52 AM CDT -----  Contact: self  Patient needs refills on pain meds   subcutaneous

## 2019-12-16 NOTE — PROGRESS NOTE ADULT - SUBJECTIVE AND OBJECTIVE BOX
NEUROSURGERY POST OP NOTE:       POST OP REPAIR DEPRESSED SKULL FRACTURE.   Patient states left leg weak, unable to move ankles and toes, sensory diminished left side left chest to toes same as pre op.   pt c/o + headache  DARIEL to bulb suction     MEDICATIONS  (STANDING):  ceFAZolin   IVPB 1000 milliGRAM(s) IV Intermittent every 8 hours  levETIRAcetam 500 milliGRAM(s) Oral two times a day  sodium chloride 0.9%. 1000 milliLiter(s) (85 mL/Hr) IV Continuous <Continuous>    MEDICATIONS  (PRN):  acetaminophen   Tablet .. 650 milliGRAM(s) Oral every 6 hours PRN Temp greater or equal to 38C (100.4F), Moderate Pain (4 - 6)  HYDROmorphone  Injectable 0.5 milliGRAM(s) IV Push once PRN Severe Pain (7 - 10)            Vital Signs Last 24 Hrs  T(C): 37.4 (16 Dec 2019 18:30), Max: 37.4 (16 Dec 2019 18:30)  T(F): 99.3 (16 Dec 2019 18:30), Max: 99.3 (16 Dec 2019 18:30)  HR: 77 (16 Dec 2019 20:00) (62 - 95)  BP: 137/79 (16 Dec 2019 20:00) (112/67 - 140/87)  BP(mean): 103 (16 Dec 2019 20:00) (64 - 109)  RR: 24 (16 Dec 2019 20:00) (12 - 35)  SpO2: 99% (16 Dec 2019 20:00) (95% - 100%)    PHYSICAL EXAM:  GENERAL: NAD, well-groomed, well-developed  HEAD: Cranial dressing intact,  Normocephalic  EYES: EOMI, PERRLA, conjunctiva and sclera clear  ENMT:  Moist mucous membranes  NECK: Supple, No JVD  NERVOUS SYSTEM:  Alert & Oriented X3, Good concentration;   Motor Strength 5/5 B/L upper   LLE 5/5 ALL,  RLE: hip and knee 5-/5, ankle and toes 0/5,   diminished sensory right side chest to toes   CHEST/LUNG: Clear  bilaterally; No rales, rhonchi, wheezing, or rubs  HEART: Regular rate   ABDOMEN: Soft, Nontender, Nondistended; Bowel sounds hypoactive   EXTREMITIES:  2+ Peripheral Pulses, No clubbing, cyanosis, or edema  LYMPH: No lymphadenopathy noted  SKIN: No rashes or lesions      LABS:                        16.0   11.39 )-----------( 219      ( 16 Dec 2019 09:25 )             45.4     12-16    138  |  103  |  9.0  ----------------------------<  99  4.2   |  21.0<L>  |  0.62    Ca    8.6      16 Dec 2019 09:25  Phos  2.9     12-16  Mg     2.0     12-16    TPro  8.1  /  Alb  5.1  /  TBili  0.3<L>  /  DBili  x   /  AST  36  /  ALT  29  /  AlkPhos  79  12-15    PT/INR - ( 15 Dec 2019 22:44 )   PT: 10.6 sec;   INR: 0.92 ratio         PTT - ( 15 Dec 2019 22:44 )  PTT:25.6 sec

## 2019-12-16 NOTE — PROGRESS NOTE ADULT - ASSESSMENT
IMP:  POST OP CRANIOPLASTY FOR DEPRESSED SKULL FX  NEURO EXAM STABLE FROM PRE OP AS PER PATIENT WITH RLE WEAKNESS, NO DORSEFLEXTION OR PLANTARFLEXION, 0/5 TOES,  DIMINISHED SENSORY RIGHT SIDE CHEST TO TOES.       PLAN:  ANCEF IV Q8H  NEURO CKS Q1H  SBP<150  POST OP CT HEAD PENDING

## 2019-12-16 NOTE — PROCEDURE NOTE - NSSITEPREP_SKIN_A_CORE
povidone iodine (if allergic to chlorhexidine)
chlorhexidine/Adherence to aseptic technique: hand hygiene prior to donning barriers (gown, gloves), don cap and mask, sterile drape over patient
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
## 2019-12-16 NOTE — PROCEDURE NOTE - NSPOSTPRCRAD_GEN_A_CORE
depth of insertion/central line located in the superior vena cava/post-procedure radiography performed/17 cm/no pneumothorax
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
## 2019-12-17 ENCOUNTER — TRANSCRIPTION ENCOUNTER (OUTPATIENT)
Age: 24
End: 2019-12-17

## 2019-12-17 LAB
ANION GAP SERPL CALC-SCNC: 12 MMOL/L — SIGNIFICANT CHANGE UP (ref 5–17)
BASOPHILS # BLD AUTO: 0.03 K/UL — SIGNIFICANT CHANGE UP (ref 0–0.2)
BASOPHILS NFR BLD AUTO: 0.3 % — SIGNIFICANT CHANGE UP (ref 0–2)
BLD GP AB SCN SERPL QL: SIGNIFICANT CHANGE UP
BUN SERPL-MCNC: 9 MG/DL — SIGNIFICANT CHANGE UP (ref 8–20)
CALCIUM SERPL-MCNC: 8.9 MG/DL — SIGNIFICANT CHANGE UP (ref 8.6–10.2)
CHLORIDE SERPL-SCNC: 104 MMOL/L — SIGNIFICANT CHANGE UP (ref 98–107)
CO2 SERPL-SCNC: 22 MMOL/L — SIGNIFICANT CHANGE UP (ref 22–29)
CREAT SERPL-MCNC: 0.67 MG/DL — SIGNIFICANT CHANGE UP (ref 0.5–1.3)
EOSINOPHIL # BLD AUTO: 0 K/UL — SIGNIFICANT CHANGE UP (ref 0–0.5)
EOSINOPHIL NFR BLD AUTO: 0 % — SIGNIFICANT CHANGE UP (ref 0–6)
GLUCOSE SERPL-MCNC: 112 MG/DL — SIGNIFICANT CHANGE UP (ref 70–115)
HCT VFR BLD CALC: 42.4 % — SIGNIFICANT CHANGE UP (ref 39–50)
HGB BLD-MCNC: 14.5 G/DL — SIGNIFICANT CHANGE UP (ref 13–17)
IMM GRANULOCYTES NFR BLD AUTO: 0.5 % — SIGNIFICANT CHANGE UP (ref 0–1.5)
LYMPHOCYTES # BLD AUTO: 0.97 K/UL — LOW (ref 1–3.3)
LYMPHOCYTES # BLD AUTO: 9.7 % — LOW (ref 13–44)
MAGNESIUM SERPL-MCNC: 1.9 MG/DL — SIGNIFICANT CHANGE UP (ref 1.8–2.6)
MCHC RBC-ENTMCNC: 30.8 PG — SIGNIFICANT CHANGE UP (ref 27–34)
MCHC RBC-ENTMCNC: 34.2 GM/DL — SIGNIFICANT CHANGE UP (ref 32–36)
MCV RBC AUTO: 90 FL — SIGNIFICANT CHANGE UP (ref 80–100)
MONOCYTES # BLD AUTO: 0.72 K/UL — SIGNIFICANT CHANGE UP (ref 0–0.9)
MONOCYTES NFR BLD AUTO: 7.2 % — SIGNIFICANT CHANGE UP (ref 2–14)
NEUTROPHILS # BLD AUTO: 8.28 K/UL — HIGH (ref 1.8–7.4)
NEUTROPHILS NFR BLD AUTO: 82.3 % — HIGH (ref 43–77)
OSMOLALITY SERPL: 293 MOSMOL/KG — SIGNIFICANT CHANGE UP (ref 275–300)
PHOSPHATE SERPL-MCNC: 3.6 MG/DL — SIGNIFICANT CHANGE UP (ref 2.4–4.7)
PLATELET # BLD AUTO: 206 K/UL — SIGNIFICANT CHANGE UP (ref 150–400)
POTASSIUM SERPL-MCNC: 4.3 MMOL/L — SIGNIFICANT CHANGE UP (ref 3.5–5.3)
POTASSIUM SERPL-SCNC: 4.3 MMOL/L — SIGNIFICANT CHANGE UP (ref 3.5–5.3)
RBC # BLD: 4.71 M/UL — SIGNIFICANT CHANGE UP (ref 4.2–5.8)
RBC # FLD: 11.6 % — SIGNIFICANT CHANGE UP (ref 10.3–14.5)
SODIUM SERPL-SCNC: 138 MMOL/L — SIGNIFICANT CHANGE UP (ref 135–145)
WBC # BLD: 10.05 K/UL — SIGNIFICANT CHANGE UP (ref 3.8–10.5)
WBC # FLD AUTO: 10.05 K/UL — SIGNIFICANT CHANGE UP (ref 3.8–10.5)

## 2019-12-17 PROCEDURE — 70450 CT HEAD/BRAIN W/O DYE: CPT | Mod: 26

## 2019-12-17 PROCEDURE — 70450 CT HEAD/BRAIN W/O DYE: CPT | Mod: 26,77

## 2019-12-17 PROCEDURE — 99232 SBSQ HOSP IP/OBS MODERATE 35: CPT

## 2019-12-17 PROCEDURE — 73562 X-RAY EXAM OF KNEE 3: CPT | Mod: 26,RT

## 2019-12-17 PROCEDURE — 99233 SBSQ HOSP IP/OBS HIGH 50: CPT | Mod: 24

## 2019-12-17 PROCEDURE — 73060 X-RAY EXAM OF HUMERUS: CPT | Mod: 26,RT

## 2019-12-17 PROCEDURE — 73590 X-RAY EXAM OF LOWER LEG: CPT | Mod: 26,RT

## 2019-12-17 PROCEDURE — 73552 X-RAY EXAM OF FEMUR 2/>: CPT | Mod: 26,RT

## 2019-12-17 PROCEDURE — 73610 X-RAY EXAM OF ANKLE: CPT | Mod: 26,RT

## 2019-12-17 PROCEDURE — 73200 CT UPPER EXTREMITY W/O DYE: CPT | Mod: 26,RT

## 2019-12-17 PROCEDURE — 73620 X-RAY EXAM OF FOOT: CPT | Mod: 26,RT

## 2019-12-17 PROCEDURE — 99222 1ST HOSP IP/OBS MODERATE 55: CPT

## 2019-12-17 PROCEDURE — 73130 X-RAY EXAM OF HAND: CPT | Mod: 26,RT

## 2019-12-17 RX ORDER — CEFAZOLIN SODIUM 1 G
2000 VIAL (EA) INJECTION EVERY 8 HOURS
Refills: 0 | Status: DISCONTINUED | OUTPATIENT
Start: 2019-12-17 | End: 2019-12-18

## 2019-12-17 RX ORDER — ACETAMINOPHEN 500 MG
650 TABLET ORAL EVERY 6 HOURS
Refills: 0 | Status: DISCONTINUED | OUTPATIENT
Start: 2019-12-17 | End: 2019-12-18

## 2019-12-17 RX ORDER — OXYCODONE HYDROCHLORIDE 5 MG/1
10 TABLET ORAL EVERY 6 HOURS
Refills: 0 | Status: DISCONTINUED | OUTPATIENT
Start: 2019-12-17 | End: 2019-12-18

## 2019-12-17 RX ORDER — GABAPENTIN 400 MG/1
300 CAPSULE ORAL EVERY 8 HOURS
Refills: 0 | Status: DISCONTINUED | OUTPATIENT
Start: 2019-12-17 | End: 2019-12-18

## 2019-12-17 RX ORDER — BACITRACIN ZINC 500 UNIT/G
1 OINTMENT IN PACKET (EA) TOPICAL EVERY 12 HOURS
Refills: 0 | Status: DISCONTINUED | OUTPATIENT
Start: 2019-12-17 | End: 2019-12-18

## 2019-12-17 RX ORDER — OXYCODONE HYDROCHLORIDE 5 MG/1
5 TABLET ORAL EVERY 4 HOURS
Refills: 0 | Status: DISCONTINUED | OUTPATIENT
Start: 2019-12-17 | End: 2019-12-18

## 2019-12-17 RX ORDER — ENOXAPARIN SODIUM 100 MG/ML
40 INJECTION SUBCUTANEOUS DAILY
Refills: 0 | Status: DISCONTINUED | OUTPATIENT
Start: 2019-12-17 | End: 2019-12-18

## 2019-12-17 RX ORDER — MAGNESIUM SULFATE 500 MG/ML
2 VIAL (ML) INJECTION ONCE
Refills: 0 | Status: COMPLETED | OUTPATIENT
Start: 2019-12-17 | End: 2019-12-17

## 2019-12-17 RX ADMIN — GABAPENTIN 300 MILLIGRAM(S): 400 CAPSULE ORAL at 22:02

## 2019-12-17 RX ADMIN — Medication 50 GRAM(S): at 05:58

## 2019-12-17 RX ADMIN — OXYCODONE HYDROCHLORIDE 5 MILLIGRAM(S): 5 TABLET ORAL at 06:58

## 2019-12-17 RX ADMIN — OXYCODONE HYDROCHLORIDE 10 MILLIGRAM(S): 5 TABLET ORAL at 09:45

## 2019-12-17 RX ADMIN — Medication 650 MILLIGRAM(S): at 06:58

## 2019-12-17 RX ADMIN — OXYCODONE HYDROCHLORIDE 10 MILLIGRAM(S): 5 TABLET ORAL at 23:53

## 2019-12-17 RX ADMIN — LEVETIRACETAM 500 MILLIGRAM(S): 250 TABLET, FILM COATED ORAL at 05:58

## 2019-12-17 RX ADMIN — LEVETIRACETAM 500 MILLIGRAM(S): 250 TABLET, FILM COATED ORAL at 17:45

## 2019-12-17 RX ADMIN — Medication 100 MILLIGRAM(S): at 05:58

## 2019-12-17 RX ADMIN — Medication 1 APPLICATION(S): at 17:52

## 2019-12-17 RX ADMIN — Medication 100 MILLIGRAM(S): at 22:34

## 2019-12-17 RX ADMIN — OXYCODONE HYDROCHLORIDE 10 MILLIGRAM(S): 5 TABLET ORAL at 23:14

## 2019-12-17 RX ADMIN — Medication 650 MILLIGRAM(S): at 12:00

## 2019-12-17 RX ADMIN — OXYCODONE HYDROCHLORIDE 5 MILLIGRAM(S): 5 TABLET ORAL at 05:58

## 2019-12-17 RX ADMIN — Medication 100 MILLIGRAM(S): at 13:40

## 2019-12-17 RX ADMIN — Medication 650 MILLIGRAM(S): at 11:35

## 2019-12-17 RX ADMIN — SODIUM CHLORIDE 85 MILLILITER(S): 9 INJECTION INTRAMUSCULAR; INTRAVENOUS; SUBCUTANEOUS at 05:58

## 2019-12-17 RX ADMIN — OXYCODONE HYDROCHLORIDE 10 MILLIGRAM(S): 5 TABLET ORAL at 16:20

## 2019-12-17 RX ADMIN — Medication 650 MILLIGRAM(S): at 19:06

## 2019-12-17 RX ADMIN — Medication 650 MILLIGRAM(S): at 05:58

## 2019-12-17 RX ADMIN — ENOXAPARIN SODIUM 40 MILLIGRAM(S): 100 INJECTION SUBCUTANEOUS at 13:40

## 2019-12-17 RX ADMIN — GABAPENTIN 300 MILLIGRAM(S): 400 CAPSULE ORAL at 13:40

## 2019-12-17 RX ADMIN — OXYCODONE HYDROCHLORIDE 10 MILLIGRAM(S): 5 TABLET ORAL at 08:45

## 2019-12-17 RX ADMIN — OXYCODONE HYDROCHLORIDE 10 MILLIGRAM(S): 5 TABLET ORAL at 15:23

## 2019-12-17 RX ADMIN — Medication 650 MILLIGRAM(S): at 17:45

## 2019-12-17 NOTE — PROGRESS NOTE ADULT - ASSESSMENT
Pt. requires resting AFO and interface socks.  Device to provide neutral positioning, reduce risk of pressure and ulceration to heel, reduce risk of contracture, socks to prevent skin breakdown.
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
## 2019-12-17 NOTE — PROGRESS NOTE ADULT - SUBJECTIVE AND OBJECTIVE BOX
HISTORY  24y Male    24 HOUR EVENTS: 12/16 OR for depressed skull fractures. Lacs repaired, C-collar cleared.    SUBJECTIVE/ROS:  [x ] A ten-point review of systems was otherwise negative except as noted.  [ ] Due to altered mental status/intubation, subjective information were not able to be obtained from the patient. History was obtained, to the extent possible, from review of the chart and collateral sources of information.      NEURO  RASS:     GCS: 15    CAM ICU:  Exam: RLE weakness and paresthesias  Meds: acetaminophen   Tablet .. 650 milliGRAM(s) Oral every 6 hours  levETIRAcetam 500 milliGRAM(s) Oral two times a day  oxyCODONE    IR 5 milliGRAM(s) Oral every 4 hours PRN Moderate Pain (4 - 6)    [x] Adequacy of sedation and pain control has been assessed and adjusted      RESPIRATORY  RR: 17 (12-17-19 @ 06:00) (15 - 35)  SpO2: 96% (12-17-19 @ 06:00) (94% - 100%)  Wt(kg): 68kg  Exam: unlabored, clear to auscultation bilaterally  Mechanical Ventilation:   ABG - ( 16 Dec 2019 09:26 )  pH: x     /  pCO2: x     /  pO2: x     / HCO3: x     / Base Excess: x     /  SaO2: x       Lactate: 0.8              [x ] Extubation Readiness Assessed  Meds:         CARDIOVASCULAR  HR: 63 (12-17-19 @ 06:00) (61 - 80)  BP: 112/64 (12-17-19 @ 06:00) (112/64 - 140/87)  BP(mean): 82 (12-17-19 @ 06:00) (77 - 109)  ABP: 112/64  ABP(mean):   Wt(kg): 68kg  CVP(cm H2O): --  VBG - ( 15 Dec 2019 22:43 )  pH: 7.30  /  pCO2: 37    /  pO2: 72    / HCO3: 19    / Base Excess: -7.3  /  SaO2: 93     Lactate: 4.5              Lactate, Blood: 0.8 mmol/L (12-16 @ 09:26)    Exam:  Cardiac Rhythm:  Perfusion     [ x]Adequate   [ ]Inadequate  Mentation   [ x]Normal       [ ]Reduced  Extremities  [x ]Warm         [ ]Cool  Volume Status [ ]Hypervolemic [x ]Euvolemic [ ]Hypovolemic  Meds:       GI/NUTRITION  Exam: wnl  Diet: advance diet at tolerated  Meds:     GENITOURINARY  I&O's Detail    12-16 @ 07:01  -  12-17 @ 07:00  --------------------------------------------------------  IN:    sodium chloride 0.9%: 1050 mL    sodium chloride 0.9%.: 765 mL    Solution: 100 mL    Solution: 150 mL  Total IN: 2065 mL    OUT:    Indwelling Catheter - Urethral: 2585 mL  Total OUT: 2585 mL    Total NET: -520 mL        Weight (kg): 68.2 (12-16 @ 18:47)  12-17    138  |  104  |  9.0  ----------------------------<  112  4.3   |  22.0  |  0.67    Ca    8.9      17 Dec 2019 04:47  Phos  3.6     12-17  Mg     1.9     12-17    TPro  8.1  /  Alb  5.1  /  TBili  0.3<L>  /  DBili  x   /  AST  36  /  ALT  29  /  AlkPhos  79  12-15    [ ] Irizarry catheter, indication: N/A  Meds: sodium chloride 0.9%. 1000 milliLiter(s) IV Continuous <Continuous>        HEMATOLOGIC  Meds:   [x] VTE Prophylaxis                        14.5   10.05 )-----------( 206      ( 17 Dec 2019 04:47 )             42.4     PT/INR - ( 15 Dec 2019 22:44 )   PT: 10.6 sec;   INR: 0.92 ratio         PTT - ( 15 Dec 2019 22:44 )  PTT:25.6 sec  Transfusion     [ ] PRBC   [ ] Platelets   [ ] FFP   [ ] Cryoprecipitate      INFECTIOUS DISEASES  T(C): 37.1 (12-17-19 @ 04:00), Max: 37.4 (12-16-19 @ 18:30)  Wt(kg): --  WBC Count: 10.05 K/uL (12-17 @ 04:47)  WBC Count: 11.39 K/uL (12-16 @ 09:25)    Recent Cultures:  Specimen Source: .Surgical Swab left depressed skull (swabs), 12-16 @ 20:16; Results --; Gram Stain:   No WBC's seen.  No organisms seen; Organism: --    Meds: ceFAZolin   IVPB 1000 milliGRAM(s) IV Intermittent every 8 hours        ENDOCRINE  Capillary Blood Glucose    Meds:       ACCESS DEVICES:  [ ] Peripheral IV  [ ] Central Venous Line	[ ] R	[ ] L	[ ] IJ	[ ] Fem	[ ] SC	Placed:   [ ] Arterial Line		[ ] R	[ ] L	[ ] Fem	[ ] Rad	[ ] Ax	Placed:   [ ] PICC:					[ ] Mediport  [ ] Urinary Catheter, Date Placed:   [ ] Necessity of urinary, arterial, and venous catheters discussed    OTHER MEDICATIONS:      CODE STATUS:     IMAGING:    ____ minutes of critical care time spent providing medical care for patient's acute illness/conditions that impairs at least one vital organ system and/or poses a high risk of imminent or life threatening deterioration in the patient's condition. It includes time spent evaluating and treating the patient's acute illness as well as time spent reviewing labs, radiology, discussing goals of care with patient and/or patient's family, and discussing the case with a multidisciplinary team in an effort to prevent further life threatening deterioration or end organ damage. This time is independent of any procedures performed. HISTORY  24y Male    24 HOUR EVENTS: 12/16 OR for depressed skull fractures. Lacs repaired, C-collar cleared, central line placement.    SUBJECTIVE/ROS:  [x ] A ten-point review of systems was otherwise negative except as noted.  [ ] Due to altered mental status/intubation, subjective information were not able to be obtained from the patient. History was obtained, to the extent possible, from review of the chart and collateral sources of information.      NEURO  RASS:     GCS: 15    CAM ICU:  Exam: RLE weakness and paresthesias  Meds: acetaminophen   Tablet .. 650 milliGRAM(s) Oral every 6 hours  levETIRAcetam 500 milliGRAM(s) Oral two times a day  oxyCODONE    IR 5 milliGRAM(s) Oral every 4 hours PRN Moderate Pain (4 - 6)    [x] Adequacy of sedation and pain control has been assessed and adjusted      RESPIRATORY  RR: 17 (12-17-19 @ 06:00) (15 - 35)  SpO2: 96% (12-17-19 @ 06:00) (94% - 100%)  Wt(kg): 68kg  Exam: unlabored, clear to auscultation bilaterally  Mechanical Ventilation:   ABG - ( 16 Dec 2019 09:26 )  pH: x     /  pCO2: x     /  pO2: x     / HCO3: x     / Base Excess: x     /  SaO2: x       Lactate: 0.8              [x ] Extubation Readiness Assessed  Meds:         CARDIOVASCULAR  HR: 63 (12-17-19 @ 06:00) (61 - 80)  BP: 112/64 (12-17-19 @ 06:00) (112/64 - 140/87)  BP(mean): 82 (12-17-19 @ 06:00) (77 - 109)  ABP: 112/64  ABP(mean):   Wt(kg): 68kg  CVP(cm H2O): --  VBG - ( 15 Dec 2019 22:43 )  pH: 7.30  /  pCO2: 37    /  pO2: 72    / HCO3: 19    / Base Excess: -7.3  /  SaO2: 93     Lactate: 4.5              Lactate, Blood: 0.8 mmol/L (12-16 @ 09:26)    Exam:  Cardiac Rhythm:  Perfusion     [ x]Adequate   [ ]Inadequate  Mentation   [ x]Normal       [ ]Reduced  Extremities  [x ]Warm         [ ]Cool  Volume Status [ ]Hypervolemic [x ]Euvolemic [ ]Hypovolemic  Meds:       GI/NUTRITION  Exam: wnl  Diet: advance diet at tolerated  Meds:     GENITOURINARY  I&O's Detail    12-16 @ 07:01  -  12-17 @ 07:00  --------------------------------------------------------  IN:    sodium chloride 0.9%: 1050 mL    sodium chloride 0.9%.: 765 mL    Solution: 100 mL    Solution: 150 mL  Total IN: 2065 mL    OUT:    Indwelling Catheter - Urethral: 2585 mL  Total OUT: 2585 mL    Total NET: -520 mL        Weight (kg): 68.2 (12-16 @ 18:47)  12-17    138  |  104  |  9.0  ----------------------------<  112  4.3   |  22.0  |  0.67    Ca    8.9      17 Dec 2019 04:47  Phos  3.6     12-17  Mg     1.9     12-17    TPro  8.1  /  Alb  5.1  /  TBili  0.3<L>  /  DBili  x   /  AST  36  /  ALT  29  /  AlkPhos  79  12-15    [x ] Irizarry catheter, indication: N/A  Meds: sodium chloride 0.9%. 1000 milliLiter(s) IV Continuous <Continuous>        HEMATOLOGIC  Meds:   [x] VTE Prophylaxis                        14.5   10.05 )-----------( 206      ( 17 Dec 2019 04:47 )             42.4     PT/INR - ( 15 Dec 2019 22:44 )   PT: 10.6 sec;   INR: 0.92 ratio         PTT - ( 15 Dec 2019 22:44 )  PTT:25.6 sec  Transfusion     [ ] PRBC   [ ] Platelets   [ ] FFP   [ ] Cryoprecipitate      INFECTIOUS DISEASES  T(C): 37.1 (12-17-19 @ 04:00), Max: 37.4 (12-16-19 @ 18:30)  Wt(kg): --  WBC Count: 10.05 K/uL (12-17 @ 04:47)  WBC Count: 11.39 K/uL (12-16 @ 09:25)    Recent Cultures:  Specimen Source: .Surgical Swab left depressed skull (swabs), 12-16 @ 20:16; Results --; Gram Stain:   No WBC's seen.  No organisms seen; Organism: --      Meds: ceFAZolin   IVPB 1000 milliGRAM(s) IV Intermittent every 8 hours        ENDOCRINE  Capillary Blood Glucose    Meds:   MEDICATIONS  (STANDING):  acetaminophen   Tablet .. 650 milliGRAM(s) Oral every 6 hours  ceFAZolin   IVPB 1000 milliGRAM(s) IV Intermittent every 8 hours  levETIRAcetam 500 milliGRAM(s) Oral two times a day  sodium chloride 0.9%. 1000 milliLiter(s) (85 mL/Hr) IV Continuous <Continuous>      ACCESS DEVICES:  [x ] Peripheral IV  [ ] Central Venous Line	[ x] R	[ ] L	[ ] IJ	[ ] Fem	[ ] SC	Placed:   [ ] Arterial Line		[ ] R	[ ] L	[ ] Fem	[ ] Rad	[ ] Ax	Placed:   [ ] PICC:					[ ] Mediport  [x ] Urinary Catheter, Date Placed:   [ ] Necessity of urinary, arterial, and venous catheters discussed    OTHER MEDICATIONS:      CODE STATUS: full    IMAGING:    ____ minutes of critical care time spent providing medical care for patient's acute illness/conditions that impairs at least one vital organ system and/or poses a high risk of imminent or life threatening deterioration in the patient's condition. It includes time spent evaluating and treating the patient's acute illness as well as time spent reviewing labs, radiology, discussing goals of care with patient and/or patient's family, and discussing the case with a multidisciplinary team in an effort to prevent further life threatening deterioration or end organ damage. This time is independent of any procedures performed.

## 2019-12-17 NOTE — PHYSICAL THERAPY INITIAL EVALUATION ADULT - ASR WT BEARING STATUS EVAL
Pt kept pt. NWB right UE 2/2 scaphoid fx. Spoke with Alec MARS and requested order placed for NWB right UE. PA in agreement pt. can use platform RW. Will place in orders
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
## 2019-12-17 NOTE — PROGRESS NOTE ADULT - PROBLEM SELECTOR PLAN 1
Fit/dispensed PRAFO and interface socks.  Pt. to use as directed by   Please call   [facility] with any questions, [phone]
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
## 2019-12-17 NOTE — OCCUPATIONAL THERAPY INITIAL EVALUATION ADULT - DIAGNOSIS, OT EVAL
Left parietal cranioplasty secondary to skull fx; Right scaphoid fx and chronic avulsion of ulnar styloid tip; bilat nasal fxs

## 2019-12-18 ENCOUNTER — TRANSCRIPTION ENCOUNTER (OUTPATIENT)
Age: 24
End: 2019-12-18

## 2019-12-18 LAB
ANION GAP SERPL CALC-SCNC: 12 MMOL/L — SIGNIFICANT CHANGE UP (ref 5–17)
BASOPHILS # BLD AUTO: 0.03 K/UL — SIGNIFICANT CHANGE UP (ref 0–0.2)
BASOPHILS NFR BLD AUTO: 0.4 % — SIGNIFICANT CHANGE UP (ref 0–2)
BUN SERPL-MCNC: 11 MG/DL — SIGNIFICANT CHANGE UP (ref 8–20)
CALCIUM SERPL-MCNC: 8.9 MG/DL — SIGNIFICANT CHANGE UP (ref 8.6–10.2)
CHLORIDE SERPL-SCNC: 103 MMOL/L — SIGNIFICANT CHANGE UP (ref 98–107)
CO2 SERPL-SCNC: 23 MMOL/L — SIGNIFICANT CHANGE UP (ref 22–29)
CREAT SERPL-MCNC: 0.74 MG/DL — SIGNIFICANT CHANGE UP (ref 0.5–1.3)
EOSINOPHIL # BLD AUTO: 0.21 K/UL — SIGNIFICANT CHANGE UP (ref 0–0.5)
EOSINOPHIL NFR BLD AUTO: 2.7 % — SIGNIFICANT CHANGE UP (ref 0–6)
GLUCOSE SERPL-MCNC: 89 MG/DL — SIGNIFICANT CHANGE UP (ref 70–115)
HCT VFR BLD CALC: 42.5 % — SIGNIFICANT CHANGE UP (ref 39–50)
HGB BLD-MCNC: 14.4 G/DL — SIGNIFICANT CHANGE UP (ref 13–17)
IMM GRANULOCYTES NFR BLD AUTO: 0.4 % — SIGNIFICANT CHANGE UP (ref 0–1.5)
LYMPHOCYTES # BLD AUTO: 2.06 K/UL — SIGNIFICANT CHANGE UP (ref 1–3.3)
LYMPHOCYTES # BLD AUTO: 26.3 % — SIGNIFICANT CHANGE UP (ref 13–44)
MAGNESIUM SERPL-MCNC: 2.1 MG/DL — SIGNIFICANT CHANGE UP (ref 1.8–2.6)
MCHC RBC-ENTMCNC: 30.6 PG — SIGNIFICANT CHANGE UP (ref 27–34)
MCHC RBC-ENTMCNC: 33.9 GM/DL — SIGNIFICANT CHANGE UP (ref 32–36)
MCV RBC AUTO: 90.2 FL — SIGNIFICANT CHANGE UP (ref 80–100)
MONOCYTES # BLD AUTO: 0.83 K/UL — SIGNIFICANT CHANGE UP (ref 0–0.9)
MONOCYTES NFR BLD AUTO: 10.6 % — SIGNIFICANT CHANGE UP (ref 2–14)
NEUTROPHILS # BLD AUTO: 4.66 K/UL — SIGNIFICANT CHANGE UP (ref 1.8–7.4)
NEUTROPHILS NFR BLD AUTO: 59.6 % — SIGNIFICANT CHANGE UP (ref 43–77)
PHOSPHATE SERPL-MCNC: 2.7 MG/DL — SIGNIFICANT CHANGE UP (ref 2.4–4.7)
PLATELET # BLD AUTO: 196 K/UL — SIGNIFICANT CHANGE UP (ref 150–400)
POTASSIUM SERPL-MCNC: 3.6 MMOL/L — SIGNIFICANT CHANGE UP (ref 3.5–5.3)
POTASSIUM SERPL-SCNC: 3.6 MMOL/L — SIGNIFICANT CHANGE UP (ref 3.5–5.3)
RBC # BLD: 4.71 M/UL — SIGNIFICANT CHANGE UP (ref 4.2–5.8)
RBC # FLD: 11.8 % — SIGNIFICANT CHANGE UP (ref 10.3–14.5)
SODIUM SERPL-SCNC: 138 MMOL/L — SIGNIFICANT CHANGE UP (ref 135–145)
WBC # BLD: 7.82 K/UL — SIGNIFICANT CHANGE UP (ref 3.8–10.5)
WBC # FLD AUTO: 7.82 K/UL — SIGNIFICANT CHANGE UP (ref 3.8–10.5)

## 2019-12-18 PROCEDURE — 99231 SBSQ HOSP IP/OBS SF/LOW 25: CPT

## 2019-12-18 PROCEDURE — 99232 SBSQ HOSP IP/OBS MODERATE 35: CPT

## 2019-12-18 RX ORDER — ACETAMINOPHEN 500 MG
650 TABLET ORAL EVERY 6 HOURS
Refills: 0 | Status: DISCONTINUED | OUTPATIENT
Start: 2019-12-18 | End: 2019-12-20

## 2019-12-18 RX ORDER — GABAPENTIN 400 MG/1
300 CAPSULE ORAL EVERY 8 HOURS
Refills: 0 | Status: DISCONTINUED | OUTPATIENT
Start: 2019-12-18 | End: 2019-12-20

## 2019-12-18 RX ORDER — ONDANSETRON 8 MG/1
4 TABLET, FILM COATED ORAL ONCE
Refills: 0 | Status: DISCONTINUED | OUTPATIENT
Start: 2019-12-18 | End: 2019-12-19

## 2019-12-18 RX ORDER — CEFAZOLIN SODIUM 1 G
2000 VIAL (EA) INJECTION EVERY 8 HOURS
Refills: 0 | Status: DISCONTINUED | OUTPATIENT
Start: 2019-12-18 | End: 2019-12-20

## 2019-12-18 RX ORDER — VANCOMYCIN HCL 1 G
1000 VIAL (EA) INTRAVENOUS ONCE
Refills: 0 | Status: DISCONTINUED | OUTPATIENT
Start: 2019-12-18 | End: 2019-12-18

## 2019-12-18 RX ORDER — SODIUM CHLORIDE 9 MG/ML
1000 INJECTION, SOLUTION INTRAVENOUS
Refills: 0 | Status: DISCONTINUED | OUTPATIENT
Start: 2019-12-18 | End: 2019-12-19

## 2019-12-18 RX ORDER — BACITRACIN ZINC 500 UNIT/G
1 OINTMENT IN PACKET (EA) TOPICAL EVERY 12 HOURS
Refills: 0 | Status: DISCONTINUED | OUTPATIENT
Start: 2019-12-18 | End: 2019-12-20

## 2019-12-18 RX ORDER — FENTANYL CITRATE 50 UG/ML
25 INJECTION INTRAVENOUS
Refills: 0 | Status: DISCONTINUED | OUTPATIENT
Start: 2019-12-18 | End: 2019-12-19

## 2019-12-18 RX ORDER — OXYCODONE HYDROCHLORIDE 5 MG/1
5 TABLET ORAL EVERY 4 HOURS
Refills: 0 | Status: DISCONTINUED | OUTPATIENT
Start: 2019-12-18 | End: 2019-12-20

## 2019-12-18 RX ORDER — ENOXAPARIN SODIUM 100 MG/ML
40 INJECTION SUBCUTANEOUS DAILY
Refills: 0 | Status: DISCONTINUED | OUTPATIENT
Start: 2019-12-18 | End: 2019-12-20

## 2019-12-18 RX ORDER — LEVETIRACETAM 250 MG/1
500 TABLET, FILM COATED ORAL
Refills: 0 | Status: DISCONTINUED | OUTPATIENT
Start: 2019-12-18 | End: 2019-12-20

## 2019-12-18 RX ORDER — OXYCODONE HYDROCHLORIDE 5 MG/1
10 TABLET ORAL EVERY 6 HOURS
Refills: 0 | Status: DISCONTINUED | OUTPATIENT
Start: 2019-12-18 | End: 2019-12-20

## 2019-12-18 RX ORDER — POTASSIUM CHLORIDE 20 MEQ
10 PACKET (EA) ORAL
Refills: 0 | Status: COMPLETED | OUTPATIENT
Start: 2019-12-18 | End: 2019-12-18

## 2019-12-18 RX ADMIN — Medication 650 MILLIGRAM(S): at 11:58

## 2019-12-18 RX ADMIN — Medication 650 MILLIGRAM(S): at 11:27

## 2019-12-18 RX ADMIN — GABAPENTIN 300 MILLIGRAM(S): 400 CAPSULE ORAL at 05:31

## 2019-12-18 RX ADMIN — FENTANYL CITRATE 25 MICROGRAM(S): 50 INJECTION INTRAVENOUS at 22:31

## 2019-12-18 RX ADMIN — Medication 1 APPLICATION(S): at 06:35

## 2019-12-18 RX ADMIN — OXYCODONE HYDROCHLORIDE 5 MILLIGRAM(S): 5 TABLET ORAL at 17:50

## 2019-12-18 RX ADMIN — Medication 100 MILLIEQUIVALENT(S): at 06:19

## 2019-12-18 RX ADMIN — Medication 100 MILLIGRAM(S): at 14:07

## 2019-12-18 RX ADMIN — SODIUM CHLORIDE 100 MILLILITER(S): 9 INJECTION, SOLUTION INTRAVENOUS at 22:37

## 2019-12-18 RX ADMIN — LEVETIRACETAM 500 MILLIGRAM(S): 250 TABLET, FILM COATED ORAL at 05:31

## 2019-12-18 RX ADMIN — LEVETIRACETAM 500 MILLIGRAM(S): 250 TABLET, FILM COATED ORAL at 22:42

## 2019-12-18 RX ADMIN — Medication 650 MILLIGRAM(S): at 05:31

## 2019-12-18 RX ADMIN — GABAPENTIN 300 MILLIGRAM(S): 400 CAPSULE ORAL at 14:07

## 2019-12-18 RX ADMIN — Medication 100 MILLIEQUIVALENT(S): at 07:46

## 2019-12-18 RX ADMIN — Medication 650 MILLIGRAM(S): at 06:25

## 2019-12-18 RX ADMIN — LEVETIRACETAM 500 MILLIGRAM(S): 250 TABLET, FILM COATED ORAL at 17:50

## 2019-12-18 RX ADMIN — GABAPENTIN 300 MILLIGRAM(S): 400 CAPSULE ORAL at 22:36

## 2019-12-18 RX ADMIN — Medication 650 MILLIGRAM(S): at 00:07

## 2019-12-18 RX ADMIN — Medication 100 MILLIGRAM(S): at 05:28

## 2019-12-18 RX ADMIN — Medication 100 MILLIGRAM(S): at 22:32

## 2019-12-18 RX ADMIN — FENTANYL CITRATE 25 MICROGRAM(S): 50 INJECTION INTRAVENOUS at 22:45

## 2019-12-18 RX ADMIN — FENTANYL CITRATE 25 MICROGRAM(S): 50 INJECTION INTRAVENOUS at 22:55

## 2019-12-18 RX ADMIN — Medication 1 APPLICATION(S): at 17:52

## 2019-12-18 RX ADMIN — Medication 100 MILLIEQUIVALENT(S): at 08:50

## 2019-12-18 RX ADMIN — FENTANYL CITRATE 25 MICROGRAM(S): 50 INJECTION INTRAVENOUS at 22:56

## 2019-12-18 RX ADMIN — Medication 650 MILLIGRAM(S): at 00:55

## 2019-12-18 RX ADMIN — OXYCODONE HYDROCHLORIDE 10 MILLIGRAM(S): 5 TABLET ORAL at 22:46

## 2019-12-18 RX ADMIN — Medication 650 MILLIGRAM(S): at 17:50

## 2019-12-18 NOTE — DISCHARGE NOTE PROVIDER - NSDCCPTREATMENT_GEN_ALL_CORE_FT
PRINCIPAL PROCEDURE  Procedure: Craniectomy, with calvarial bone graft application  Findings and Treatment:       SECONDARY PROCEDURE  Procedure: Open reduction of simple fracture of nasal bone  Findings and Treatment:
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
## 2019-12-18 NOTE — PROGRESS NOTE ADULT - SUBJECTIVE AND OBJECTIVE BOX
HISTORY  24y Male    24 HOUR EVENTS: Scaphoid fracture, thumb spica placed. Passed TOV. Diet advanced. Central line removed. No acute events.     SUBJECTIVE/ROS:  [x] A ten-point review of systems was otherwise negative except as noted.  [ ] Due to altered mental status/intubation, subjective information were not able to be obtained from the patient. History was obtained, to the extent possible, from review of the chart and collateral sources of information.      NEURO  RASS: 0    GCS: 15    CAM ICU: neg  Exam: gcs 15  Meds: acetaminophen   Tablet .. 650 milliGRAM(s) Oral every 6 hours  gabapentin 300 milliGRAM(s) Oral every 8 hours  levETIRAcetam 500 milliGRAM(s) Oral two times a day  oxyCODONE    IR 10 milliGRAM(s) Oral every 6 hours PRN Severe Pain (7 - 10)  oxyCODONE    IR 5 milliGRAM(s) Oral every 4 hours PRN Moderate Pain (4 - 6)    [x] Adequacy of sedation and pain control has been assessed and adjusted      RESPIRATORY  RR: 14 (12-18-19 @ 06:00) (14 - 26)  SpO2: 97% (12-18-19 @ 06:00) (95% - 100%)  Wt(kg): 68  Exam: unlabored, clear to auscultation bilaterally  Mechanical Ventilation:   ABG - ( 16 Dec 2019 09:26 )  pH: x     /  pCO2: x     /  pO2: x     / HCO3: x     / Base Excess: x     /  SaO2: x       Lactate: 0.8              [ ] Extubation Readiness Assessed  Meds:       CARDIOVASCULAR  HR: 56 (12-18-19 @ 06:00) (49 - 78)  BP: 109/58 (12-18-19 @ 06:00) (101/55 - 125/68)  BP(mean): 79 (12-18-19 @ 06:00) (73 - 93)  ABP: --  ABP(mean): --  Wt(kg): --  CVP(cm H2O): --      Exam:  Cardiac Rhythm:  Perfusion     [x ]Adequate   [ ]Inadequate  Mentation   [x ]Normal       [ ]Reduced  Extremities  [x ]Warm         [ ]Cool  Volume Status [ ]Hypervolemic [x ]Euvolemic [ ]Hypovolemic  Meds:       GI/NUTRITION  Exam:  Diet:  Meds:     GENITOURINARY  I&O's Detail    12-17 @ 07:01  -  12-18 @ 07:00  --------------------------------------------------------  IN:    Oral Fluid: 240 mL    sodium chloride 0.9%: 170 mL    Solution: 100 mL    Solution: 100 mL  Total IN: 610 mL    OUT:    Indwelling Catheter - Urethral: 585 mL    Voided: 1800 mL  Total OUT: 2385 mL    Total NET: -1775 mL          12-18    138  |  103  |  11.0  ----------------------------<  89  3.6   |  23.0  |  0.74    Ca    8.9      18 Dec 2019 04:51  Phos  2.7     12-18  Mg     2.1     12-18      [ ] Irizarry catheter, indication: N/A  Meds: potassium chloride  10 mEq/100 mL IVPB 10 milliEquivalent(s) IV Intermittent every 1 hour        HEMATOLOGIC  Meds:   [x] VTE Prophylaxis                        14.4   7.82  )-----------( 196      ( 18 Dec 2019 04:51 )             42.5       Transfusion     [ ] PRBC   [ ] Platelets   [ ] FFP   [ ] Cryoprecipitate      INFECTIOUS DISEASES  T(C): 36.8 (12-18-19 @ 03:52), Max: 37.2 (12-18-19 @ 00:02)  Wt(kg): 68  WBC Count: 7.82 K/uL (12-18 @ 04:51)    Recent Cultures:  Specimen Source: .Surgical Swab left depressed skull (swabs), 12-16 @ 20:16; Results   No growth at 1 day.  Culture in progress; Gram Stain:   No WBC's seen.  No organisms seen; Organism: --    Meds: ceFAZolin   IVPB 2000 milliGRAM(s) IV Intermittent every 8 hours        ENDOCRINE  Capillary Blood Glucose    Meds:       ACCESS DEVICES:  [x] Peripheral IV  [ ] Central Venous Line	[ ] R	[ ] L	[ ] IJ	[ ] Fem	[ ] SC	Placed:   [ ] Arterial Line		[ ] R	[ ] L	[ ] Fem	[ ] Rad	[ ] Ax	Placed:   [ ] PICC:					[ ] Mediport  [ ] Urinary Catheter, Date Placed:   [ ] Necessity of urinary, arterial, and venous catheters discussed    OTHER MEDICATIONS:  BACItracin   Ointment 1 Application(s) Topical every 12 hours      CODE STATUS:     IMAGING:    ____ minutes of critical care time spent providing medical care for patient's acute illness/conditions that impairs at least one vital organ system and/or poses a high risk of imminent or life threatening deterioration in the patient's condition. It includes time spent evaluating and treating the patient's acute illness as well as time spent reviewing labs, radiology, discussing goals of care with patient and/or patient's family, and discussing the case with a multidisciplinary team in an effort to prevent further life threatening deterioration or end organ damage. This time is independent of any procedures performed. HISTORY  24y Male    24 HOUR EVENTS: Scaphoid fracture, thumb spica placed. Passed TOV. Diet advanced. Central line removed. No acute events.     SUBJECTIVE/ROS:  [x] A ten-point review of systems was otherwise negative except as noted.  [ ] Due to altered mental status/intubation, subjective information were not able to be obtained from the patient. History was obtained, to the extent possible, from review of the chart and collateral sources of information.      NEURO  RASS: 0    GCS: 15    CAM ICU: neg  Exam: gcs 15  Meds: acetaminophen   Tablet .. 650 milliGRAM(s) Oral every 6 hours  gabapentin 300 milliGRAM(s) Oral every 8 hours  levETIRAcetam 500 milliGRAM(s) Oral two times a day  oxyCODONE    IR 10 milliGRAM(s) Oral every 6 hours PRN Severe Pain (7 - 10)  oxyCODONE    IR 5 milliGRAM(s) Oral every 4 hours PRN Moderate Pain (4 - 6)    [x] Adequacy of sedation and pain control has been assessed and adjusted      RESPIRATORY  RR: 14 (12-18-19 @ 06:00) (14 - 26)  SpO2: 97% (12-18-19 @ 06:00) (95% - 100%)  Wt(kg): 68  Exam: unlabored, clear to auscultation bilaterally  Mechanical Ventilation:   ABG - ( 16 Dec 2019 09:26 )  pH: x     /  pCO2: x     /  pO2: x     / HCO3: x     / Base Excess: x     /  SaO2: x       Lactate: 0.8              [ ] Extubation Readiness Assessed  Meds:       CARDIOVASCULAR  HR: 56 (12-18-19 @ 06:00) (49 - 78)  BP: 109/58 (12-18-19 @ 06:00) (101/55 - 125/68)  BP(mean): 79 (12-18-19 @ 06:00) (73 - 93)  ABP: 109/58  ABP(mean): --  Wt(kg): 68  CVP(cm H2O): --      Exam:  Cardiac Rhythm:  Perfusion     [x ]Adequate   [ ]Inadequate  Mentation   [x ]Normal       [ ]Reduced  Extremities  [x ]Warm         [ ]Cool  Volume Status [ ]Hypervolemic [x ]Euvolemic [ ]Hypovolemic  Meds:       GI/NUTRITION  Exam: wnl  Diet: NPO after midnight  Meds:     GENITOURINARY  I&O's Detail    12-17 @ 07:01  -  12-18 @ 07:00  --------------------------------------------------------  IN:    Oral Fluid: 240 mL    sodium chloride 0.9%: 170 mL    Solution: 100 mL    Solution: 100 mL  Total IN: 610 mL    OUT:    Indwelling Catheter - Urethral: 585 mL    Voided: 1800 mL  Total OUT: 2385 mL    Total NET: -1775 mL          12-18    138  |  103  |  11.0  ----------------------------<  89  3.6   |  23.0  |  0.74    Ca    8.9      18 Dec 2019 04:51  Phos  2.7     12-18  Mg     2.1     12-18      [ ] Irizarry catheter, indication: N/A  Meds: potassium chloride  10 mEq/100 mL IVPB 10 milliEquivalent(s) IV Intermittent every 1 hour        HEMATOLOGIC  Meds:   [x] VTE Prophylaxis                        14.4   7.82  )-----------( 196      ( 18 Dec 2019 04:51 )             42.5       Transfusion     [ ] PRBC   [ ] Platelets   [ ] FFP   [ ] Cryoprecipitate      INFECTIOUS DISEASES  T(C): 36.8 (12-18-19 @ 03:52), Max: 37.2 (12-18-19 @ 00:02)  Wt(kg): 68  WBC Count: 7.82 K/uL (12-18 @ 04:51)    Recent Cultures:  Specimen Source: .Surgical Swab left depressed skull (swabs), 12-16 @ 20:16; Results   No growth at 1 day.  Culture in progress; Gram Stain:   No WBC's seen.  No organisms seen; Organism: --    Meds: ceFAZolin   IVPB 2000 milliGRAM(s) IV Intermittent every 8 hours        ENDOCRINE  Capillary Blood Glucose    Meds:       ACCESS DEVICES:  [x] Peripheral IV  [ ] Central Venous Line	[ ] R	[ ] L	[ ] IJ	[ ] Fem	[ ] SC	Placed:   [ ] Arterial Line		[ ] R	[ ] L	[ ] Fem	[ ] Rad	[ ] Ax	Placed:   [ ] PICC:					[ ] Mediport  [ ] Urinary Catheter, Date Placed:   [ ] Necessity of urinary, arterial, and venous catheters discussed    OTHER MEDICATIONS:  BACItracin   Ointment 1 Application(s) Topical every 12 hours      CODE STATUS:     IMAGING:    ____ minutes of critical care time spent providing medical care for patient's acute illness/conditions that impairs at least one vital organ system and/or poses a high risk of imminent or life threatening deterioration in the patient's condition. It includes time spent evaluating and treating the patient's acute illness as well as time spent reviewing labs, radiology, discussing goals of care with patient and/or patient's family, and discussing the case with a multidisciplinary team in an effort to prevent further life threatening deterioration or end organ damage. This time is independent of any procedures performed.

## 2019-12-18 NOTE — DISCHARGE NOTE PROVIDER - NSDCMRMEDTOKEN_GEN_ALL_CORE_FT
acetaminophen 325 mg oral tablet: 2 tab(s) orally every 6 hours  bacitracin 500 units/g topical ointment: 1 application topically every 12 hours  gabapentin 300 mg oral capsule: 1 cap(s) orally every 8 hours  levETIRAcetam 500 mg oral tablet: 1 tab(s) orally 2 times a day  oxyCODONE 5 mg oral tablet: 1 tab(s) orally every 6 hours, As Needed -severe pain MDD:4  pantoprazole 40 mg oral delayed release tablet: 1 tab(s) orally once a day (before a meal)
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
## 2019-12-18 NOTE — DISCHARGE NOTE PROVIDER - NSDCCPCAREPLAN_GEN_ALL_CORE_FT
PRINCIPAL DISCHARGE DIAGNOSIS  Diagnosis: Skull fracture  Assessment and Plan of Treatment:       SECONDARY DISCHARGE DIAGNOSES  Diagnosis: Scaphoid fracture  Assessment and Plan of Treatment: Follow all verbal and written instructions. Take medications as prescribed. DO NOT drive, operate machinery, and/or make important decisions while on prescription pain medication. DO NOT hesitate to call Doctor's office with questions or concerns.   * Office follow-up in one week  * continue use of splint / brace until follow-up at ALL TIMES  * No strenuous or heavy lifting with right hand.      Diagnosis: Cerebral hemorrhage  Assessment and Plan of Treatment: PRINCIPAL DISCHARGE DIAGNOSIS  Diagnosis: Skull fracture  Assessment and Plan of Treatment: Pt may resume diet as tolerated, activity as tolerated per Rehab.  Follow up with Neurosurgery 2 weeks from discharge. FINISH antibiotics until completion.  Patient is advised to RETURN TO THE EMERGENCY DEPARTMENT for any of the following - worsening pain, fever/chills, nausea/vomiting, altered mental status, chest pain, shortness of breath, or any other new / worsening symptom.        SECONDARY DISCHARGE DIAGNOSES  Diagnosis: Closed fracture of nasal bone, initial encounter  Assessment and Plan of Treatment: No nose blowing.  Keep head of bed elevated when laying down.  Follow up with Dr Tatum 2 weeks from discharge.  Call to make an appointment.    Diagnosis: Scaphoid fracture  Assessment and Plan of Treatment: Follow all verbal and written instructions. Take medications as prescribed. DO NOT drive, operate machinery, and/or make important decisions while on prescription pain medication. DO NOT hesitate to call Doctor's office with questions or concerns.   * Office follow-up in one week  * continue use of splint / brace until follow-up at ALL TIMES  * No strenuous or heavy lifting with right hand.      Diagnosis: Cerebral hemorrhage  Assessment and Plan of Treatment: PRINCIPAL DISCHARGE DIAGNOSIS  Diagnosis: Skull fracture  Assessment and Plan of Treatment: Pt may resume diet as tolerated, activity as tolerated per Rehab.  Follow up with Neurosurgery 2 weeks from discharge. FINISH antibiotics until completion.  Patient is advised to RETURN TO THE EMERGENCY DEPARTMENT for any of the following - worsening pain, fever/chills, nausea/vomiting, altered mental status, chest pain, shortness of breath, or any other new / worsening symptom.        SECONDARY DISCHARGE DIAGNOSES  Diagnosis: Closed fracture of nasal bone, initial encounter  Assessment and Plan of Treatment: No nose blowing.  Keep head of bed elevated when laying down.  Follow up with Dr Tatum next week to ahve splint removed.  Call to make an appointment.    Diagnosis: Scaphoid fracture  Assessment and Plan of Treatment: Follow all verbal and written instructions. Take medications as prescribed. DO NOT drive, operate machinery, and/or make important decisions while on prescription pain medication. DO NOT hesitate to call Doctor's office with questions or concerns.   * Office follow-up in one week  * continue use of splint / brace until follow-up at ALL TIMES  * No strenuous or heavy lifting with right hand.      Diagnosis: Cerebral hemorrhage  Assessment and Plan of Treatment:

## 2019-12-18 NOTE — DISCHARGE NOTE PROVIDER - PROVIDER TOKENS
PROVIDER:[TOKEN:[[id number]],FOLLOWUP:[1 week]] PROVIDER:[TOKEN:[[id number]],FOLLOWUP:[1 week]],PROVIDER:[TOKEN:[[id number]]],PROVIDER:[TOKEN:[[id number]]]

## 2019-12-18 NOTE — DISCHARGE NOTE PROVIDER - CARE PROVIDER_API CALL
Masood Luna)  Plastic Surgery; Surgery of the Hand  [address] [place] [address], [address]  [place] [address]  Phone: [phone]  Fax: [phone]  Follow Up Time: 1 week Masood Luna)  Plastic Surgery; Surgery of the Hand  [address] [place] [address], [address]  [place] [address]  Phone: [phone]  Fax: [phone]  Follow Up Time: 1 week    Vanessa Tatum)  Plastic Surgery  [address] [place] [address]  Phone: [phone]  Fax: [phone]  Follow Up Time:     Dipak Velez; PhD)  Neurosurgery  [address] [place] [address]  Phone: [phone]  Fax: [phone]  Follow Up Time:

## 2019-12-18 NOTE — CHART NOTE - NSCHARTNOTEFT_GEN_A_CORE
SICU TRANSFER NOTE  -----------------------------  ICU Admission Date: 12/16/19  Transfer Date: 12-18-19 @ 14:30    Admission Diagnosis: Depression skull fx, b/l nasal bone fxs, scaphoid fx    Active Problems/injuries: Depression skull fx, b/l nasal bone fxs, scaphoid fx    Procedures: 12/16-cranioplasty    Consultants:  [ ] Cardiology  [ ] Endocrine  [x] Infectious Disease  [ ] Medicine  [x]Neurosurgery  [x] Ortho  [ ] Palliative       [ ] Advanced Directives:    [ ] Physical Medicine and Rehab       [ ] Disposition :   [ ] Plastics  [ ] Pulmonary    Medications  acetaminophen   Tablet .. 650 milliGRAM(s) Oral every 6 hours  BACItracin   Ointment 1 Application(s) Topical every 12 hours  ceFAZolin   IVPB 2000 milliGRAM(s) IV Intermittent every 8 hours  gabapentin 300 milliGRAM(s) Oral every 8 hours  levETIRAcetam 500 milliGRAM(s) Oral two times a day  oxyCODONE    IR 10 milliGRAM(s) Oral every 6 hours PRN  oxyCODONE    IR 5 milliGRAM(s) Oral every 4 hours PRN  vancomycin  IVPB 1000 milliGRAM(s) IV Intermittent once PRN      [ ] I attest I have reviewed and reconciled all medications prior to transfer    IV Fluids  sodium chloride 0.9%.: Solution, 1000 milliLiter(s) infuse at 150 mL/Hr  sodium chloride 0.9% Bolus:   1000 milliLiter(s), IV Bolus, once, infuse over 1 Hour(s), Stop After 1 Doses  Provider's Contact #: [phone]  sodium chloride 0.9%.: Solution, 1000 milliLiter(s) infuse at 85 mL/Hr  Provider's Contact #: [phone]    Indication: XXXXXX    Antibiotics:  ceFAZolin   IVPB 2000 milliGRAM(s) IV Intermittent every 8 hours  vancomycin  IVPB 1000 milliGRAM(s) IV Intermittent once PRN    Indication: open skull fx End Date: 7 day couyrse of ancef, then 7 days of keflex    The following items are to be followed up:    -OR today for nasal bone fx fixation  -PT/OT/PMR  -staples in place in R forearm
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
## 2019-12-19 PROCEDURE — 99232 SBSQ HOSP IP/OBS MODERATE 35: CPT

## 2019-12-19 PROCEDURE — 99223 1ST HOSP IP/OBS HIGH 75: CPT | Mod: GC

## 2019-12-19 PROCEDURE — 99231 SBSQ HOSP IP/OBS SF/LOW 25: CPT

## 2019-12-19 RX ORDER — LEVETIRACETAM 250 MG/1
500 TABLET, FILM COATED ORAL
Refills: 0 | Status: DISCONTINUED | OUTPATIENT
Start: 2019-12-19 | End: 2019-12-19

## 2019-12-19 RX ORDER — ONDANSETRON 8 MG/1
4 TABLET, FILM COATED ORAL ONCE
Refills: 0 | Status: DISCONTINUED | OUTPATIENT
Start: 2019-12-19 | End: 2019-12-20

## 2019-12-19 RX ORDER — GABAPENTIN 400 MG/1
300 CAPSULE ORAL EVERY 8 HOURS
Refills: 0 | Status: DISCONTINUED | OUTPATIENT
Start: 2019-12-19 | End: 2019-12-19

## 2019-12-19 RX ADMIN — GABAPENTIN 300 MILLIGRAM(S): 400 CAPSULE ORAL at 21:41

## 2019-12-19 RX ADMIN — GABAPENTIN 300 MILLIGRAM(S): 400 CAPSULE ORAL at 12:47

## 2019-12-19 RX ADMIN — Medication 650 MILLIGRAM(S): at 13:45

## 2019-12-19 RX ADMIN — Medication 100 MILLIGRAM(S): at 06:08

## 2019-12-19 RX ADMIN — LEVETIRACETAM 500 MILLIGRAM(S): 250 TABLET, FILM COATED ORAL at 17:16

## 2019-12-19 RX ADMIN — Medication 650 MILLIGRAM(S): at 06:38

## 2019-12-19 RX ADMIN — Medication 650 MILLIGRAM(S): at 23:50

## 2019-12-19 RX ADMIN — OXYCODONE HYDROCHLORIDE 10 MILLIGRAM(S): 5 TABLET ORAL at 06:08

## 2019-12-19 RX ADMIN — Medication 650 MILLIGRAM(S): at 06:07

## 2019-12-19 RX ADMIN — ENOXAPARIN SODIUM 40 MILLIGRAM(S): 100 INJECTION SUBCUTANEOUS at 12:45

## 2019-12-19 RX ADMIN — Medication 1 APPLICATION(S): at 06:00

## 2019-12-19 RX ADMIN — Medication 650 MILLIGRAM(S): at 17:16

## 2019-12-19 RX ADMIN — OXYCODONE HYDROCHLORIDE 10 MILLIGRAM(S): 5 TABLET ORAL at 13:45

## 2019-12-19 RX ADMIN — OXYCODONE HYDROCHLORIDE 10 MILLIGRAM(S): 5 TABLET ORAL at 23:48

## 2019-12-19 RX ADMIN — Medication 100 MILLIGRAM(S): at 12:48

## 2019-12-19 RX ADMIN — LEVETIRACETAM 500 MILLIGRAM(S): 250 TABLET, FILM COATED ORAL at 06:07

## 2019-12-19 RX ADMIN — OXYCODONE HYDROCHLORIDE 10 MILLIGRAM(S): 5 TABLET ORAL at 06:38

## 2019-12-19 RX ADMIN — Medication 1 APPLICATION(S): at 17:17

## 2019-12-19 RX ADMIN — OXYCODONE HYDROCHLORIDE 10 MILLIGRAM(S): 5 TABLET ORAL at 12:45

## 2019-12-19 RX ADMIN — Medication 100 MILLIGRAM(S): at 21:41

## 2019-12-19 RX ADMIN — GABAPENTIN 300 MILLIGRAM(S): 400 CAPSULE ORAL at 06:07

## 2019-12-19 RX ADMIN — Medication 650 MILLIGRAM(S): at 12:46

## 2019-12-19 RX ADMIN — Medication 650 MILLIGRAM(S): at 18:15

## 2019-12-19 NOTE — OCCUPATIONAL THERAPY INITIAL EVALUATION ADULT - COORDINATION ASSESSED, REHAB EVAL
finger to nose/Left UE.  Right UE NT due to IV placement. Left UE.  Right UE NT due to IV placement./finger to nose

## 2019-12-19 NOTE — PROGRESS NOTE ADULT - SUBJECTIVE AND OBJECTIVE BOX
POSTOP CHECK    HPI/OVERNIGHT EVENTS:    Pt examined at bedside, no acute events or complains. Pt is 4h s/p open reduction of bl simple nasal fractures. Pt states uncoformability due to the packing on the nostrils, otherwise no complains. Pain under control. Pt denies chest pain, SOB, n/v/f/c.     MEDICATIONS  (STANDING):  acetaminophen   Tablet .. 650 milliGRAM(s) Oral every 6 hours  BACItracin   Ointment 1 Application(s) Topical every 12 hours  ceFAZolin   IVPB 2000 milliGRAM(s) IV Intermittent every 8 hours  enoxaparin Injectable 40 milliGRAM(s) SubCutaneous daily  gabapentin 300 milliGRAM(s) Oral every 8 hours  levETIRAcetam 500 milliGRAM(s) Oral two times a day    MEDICATIONS  (PRN):  oxyCODONE    IR 5 milliGRAM(s) Oral every 4 hours PRN Moderate Pain (4 - 6)  oxyCODONE    IR 10 milliGRAM(s) Oral every 6 hours PRN Severe Pain (7 - 10)      Vital Signs Last 24 Hrs  T(C): 36.9 (18 Dec 2019 23:00), Max: 37.3 (18 Dec 2019 15:44)  T(F): 98.4 (18 Dec 2019 23:00), Max: 99.1 (18 Dec 2019 15:44)  HR: 74 (18 Dec 2019 23:00) (53 - 74)  BP: 127/69 (18 Dec 2019 23:00) (101/55 - 144/97)  BP(mean): 83 (18 Dec 2019 23:00) (73 - 109)  RR: 14 (18 Dec 2019 23:00) (10 - 25)  SpO2: 96% (18 Dec 2019 23:00) (94% - 98%)    Constitutional: patient resting comfortably in bed, in no acute distress  HEENT: EOMI / PERRL b/l, no active drainage or redness. Head cast with Kurlex postop from Cranioplasty. Nasal splint in place with dressing postop.   Neck: No JVD, full ROM without pain. Dresing in place s/p central line placement. No central line present.   Respiratory: respirations are unlabored, no accessory muscle use, no conversational dyspnea.   Cardiovascular: regular rate & rhythm  Gastrointestinal: Abdomen soft, non-tender, non-distended, no rebound tenderness / guarding.   Neurological: GCS: 15 (4/5/6). A&O x 3; No dorsal flexion on R foot. Flexion and extension present of R leg and knee. Flexion of R foot present.   Psychiatric: Normal mood, normal affect, worried about his functional status on the R foot.   Musculoskeletal: R arm splinted for R scaphoid fracture. R leg with a boot for foot drop.   Skin laceration in the L ear lobe and R fore arm repaired in the trauma bay on arrival.       I&O's Detail    17 Dec 2019 07:01  -  18 Dec 2019 07:00  --------------------------------------------------------  IN:    Oral Fluid: 240 mL    sodium chloride 0.9%: 170 mL    Solution: 100 mL    Solution: 100 mL  Total IN: 610 mL    OUT:    Indwelling Catheter - Urethral: 585 mL    Voided: 1800 mL  Total OUT: 2385 mL    Total NET: -1775 mL      18 Dec 2019 07:01  -  19 Dec 2019 01:28  --------------------------------------------------------  IN:    lactated ringers.: 200 mL    Solution: 200 mL    Solution: 100 mL  Total IN: 500 mL    OUT:    Voided: 900 mL  Total OUT: 900 mL    Total NET: -400 mL          LABS:                        14.4   7.82  )-----------( 196      ( 18 Dec 2019 04:51 )             42.5     12-18    138  |  103  |  11.0  ----------------------------<  89  3.6   |  23.0  |  0.74    Ca    8.9      18 Dec 2019 04:51  Phos  2.7     12-18  Mg     2.1     12-18

## 2019-12-19 NOTE — PROGRESS NOTE ADULT - SUBJECTIVE AND OBJECTIVE BOX
Patient seen at bedside POD1 reports mild pain and headache over night          Exam  Sleeping comfortably in bed  Nasal splint secure  expected facial edema/  periorbital ecchymosis BL
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
## 2019-12-19 NOTE — PHYSICAL THERAPY INITIAL EVALUATION ADULT - PASSIVE RANGE OF MOTION EXAMINATION, REHAB EVAL
bilateral lower extremity Passive ROM was WFL (within functional limits)/see OT eval for UEs
Right PF/DF/INV/EV PROM WNL
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
## 2019-12-19 NOTE — OCCUPATIONAL THERAPY INITIAL EVALUATION ADULT - LIVES WITH, PROFILE
mother and brother, in an apartment with no steps to enter; pt's mother is available to assist as needed/other relative/parents
mother and brother

## 2019-12-19 NOTE — PHYSICAL THERAPY INITIAL EVALUATION ADULT - GAIT DEVIATIONS NOTED, PT EVAL
decreased step length/decreased stride length/footdrop
decreased acosta/decreased stride length/Exaggerated hip and knee flexion on Right LE secondary to absent Right foot DF/PF./decreased step length

## 2019-12-19 NOTE — PHYSICAL THERAPY INITIAL EVALUATION ADULT - IMPAIRMENTS CONTRIBUTING TO GAIT DEVIATIONS, PT EVAL
narrow base of support/impaired balance/impaired sensory feedback/decreased strength
narrow base of support/decreased strength/impaired balance/decreased ROM
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
## 2019-12-19 NOTE — OCCUPATIONAL THERAPY INITIAL EVALUATION ADULT - PLANNED THERAPY INTERVENTIONS, OT EVAL
ADL retraining/bed mobility training/fine motor coordination training/strengthening/transfer training/balance training/motor coordination training/ROM/neuromuscular re-education/cognitive, visual perceptual
ROM/strengthening/ADL retraining/balance training/motor coordination training/transfer training

## 2019-12-19 NOTE — OCCUPATIONAL THERAPY INITIAL EVALUATION ADULT - IMPAIRED TRANSFERS: TOILET, REHAB EVAL
decreased strength/impaired balance/impaired motor control/impaired coordination/narrow base of support

## 2019-12-19 NOTE — OCCUPATIONAL THERAPY INITIAL EVALUATION ADULT - RANGE OF MOTION EXAMINATION, UPPER EXTREMITY
right shoulder and elbow active ROM WFL.  Right wrist and gross grasp immobilized by cast.  Pt able to actively flex/extend/abduct/adduct digits WFL./Left UE Active ROM was WNL (within normal limits)
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
## 2019-12-19 NOTE — OCCUPATIONAL THERAPY INITIAL EVALUATION ADULT - MANUAL MUSCLE TESTING RESULTS, REHAB EVAL
Left UE WFLs; Right UE NWB
5/5 left UE, right shoulder.  Right elbow NT due to IV placement./grossly assessed due to

## 2019-12-19 NOTE — OCCUPATIONAL THERAPY INITIAL EVALUATION ADULT - IMPAIRED TRANSFERS: BED/CHAIR, REHAB EVAL
impaired balance/impaired motor control/narrow base of support/decreased flexibility/decreased strength/impaired coordination
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
## 2019-12-19 NOTE — PHYSICAL THERAPY INITIAL EVALUATION ADULT - ADDITIONAL COMMENTS
Pt. lives with his mother and brother in a house with no stairs to enter and no stairs inside. Pt. was independent PTA and does not own DME. Pt. mother at home and able to assist as needed.
Pt. lives with his mother and brother in an apartment with no stairs to enter and no stairs inside. Pt. was independent PTA and does not own DME. Pt. mother at home and able to assist as needed; she does not work.

## 2019-12-19 NOTE — PHYSICAL THERAPY INITIAL EVALUATION ADULT - PLANNED THERAPY INTERVENTIONS, PT EVAL
gait training/transfer training/bed mobility training
ROM/gait training/balance training/strengthening/bed mobility training/transfer training
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
## 2019-12-19 NOTE — CONSULT NOTE ADULT - CONSULT REQUESTED DATE/TIME
15-Dec-2019 23:30
17-Dec-2019 08:28
17-Dec-2019 13:31
17-Dec-2019 14:06
17-Dec-2019 15:32
19-Dec-2019 14:12

## 2019-12-19 NOTE — OCCUPATIONAL THERAPY INITIAL EVALUATION ADULT - VISUAL ACUITY
no vision c/o reported; pt does not wear glassess
not tested/No new visual complaints.  Pt does not wear glasses.

## 2019-12-19 NOTE — OCCUPATIONAL THERAPY INITIAL EVALUATION ADULT - IMPAIRED TRANSFERS: SIT/STAND, REHAB EVAL
impaired motor control/impaired balance/pain
decreased strength/impaired coordination/narrow base of support/impaired balance/impaired motor control

## 2019-12-19 NOTE — PHYSICAL THERAPY INITIAL EVALUATION ADULT - IMPAIRMENTS FOUND, PT EVAL
gait, locomotion, and balance
gait, locomotion, and balance/aerobic capacity/endurance/muscle strength/ROM

## 2019-12-19 NOTE — PHYSICAL THERAPY INITIAL EVALUATION ADULT - RANGE OF MOTION EXAMINATION, REHAB EVAL
Left LE ROM was WNL (within normal limits)/Left UE ROM was WNL (within normal limits)/AROM absent in Right Foot DF/PF otherwise Right LE WNL; Right wrist decreased due to splint.
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
## 2019-12-19 NOTE — OCCUPATIONAL THERAPY INITIAL EVALUATION ADULT - RANGE OF MOTION EXAMINATION
Left UE WFLs; Right UE shoulder and elbow flexion WFLs; wrist and forearm limited due to casting
deficits as listed below

## 2019-12-19 NOTE — CONSULT NOTE ADULT - ATTENDING COMMENTS
agree with above. To OR tomorrow
Patient seen and examined and cased discussed with resident. Agree with recommendations.

## 2019-12-19 NOTE — PROGRESS NOTE ADULT - ASSESSMENT
25 yo male POD 1 CR nasal bone fx    -Face to stay dry until follow up  -See Dr Rc Sands 12/24  -no nose blowing x 2 weeks  -no direct pressure to nose
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
## 2019-12-19 NOTE — PHYSICAL THERAPY INITIAL EVALUATION ADULT - MANUAL MUSCLE TESTING RESULTS, REHAB EVAL
see OT eval for UEs; LEs WFL with exception to right ankle DF/ PF 0/5 and right knee ext 3/5
See OT eval for UE's. LEs WFL except for Right DF/PF 0/5.

## 2019-12-19 NOTE — PROGRESS NOTE ADULT - ATTENDING COMMENTS
I have seen and examined the patient.  c/o headache, non focal.  Pt and OT eval  dvt chemoprophylaxis  dispo planning
Seen and examined.     NAD  AAOx3  RRR  Non labored resp  Right wrist tenderness    Reviewed imaging from overnight    L parietal depressed skull fx, open, with small hemorrhagic contusion of cerebrum near fx fragments.  Minimally displaced bilateral nasal bone fxs.  R wrist injury.  Lactic acidosis.      Cont close neuro monitoring, seizure ppx.  Ancef for open fx.  To OR for elevation of fragment.  NPO for today.  SCDs only for DVT ppx.  Dedicated R wrist film.  F/U extremity films ordered from trauma bay.  Repeat labs, ensure lactate cleared.  Clarify risk for EtOH withdrawal.
Seen and examined.      NAD  AAOx4, decreased strength in dorsiflexion of foot.    Regular, mild sinus bradycardia  Non labored resp    Repeat ct head yesterday stable. Clinically stable exam.  Wrist with scaphoid fx.  Splinted.  Ortho following. OR today for nasal fxs.  ABx for open skull fx as per ID.  Stable for transfer out of ICU.
Seen and examined.      NAD  aaox4, R lower extremity weakness, inability to dorsiflex foot, move toes.  Reported paresthesias over right leg and torso.  no objective sensory loss  RRR  non labored resp  swollen and tender over right wrist, radial aspect.     Reviewed xrays of extremities from this AM and yesterday.  No acute fxs noted.      Given continued wrist pain and swelling, suspect occult carpal fx.  Will CT.  Post op head CT with small cerebral contusion near left hemisphere motor strip.  Suspect cause for neuro deficit.  F/u CT this AM to ensure hemorrhage is not expanding.  If stable decrease frequency of neuro checks.  Chem DVT ppx tonight.  OR for nasal bone fxs tomorrow with plastic surg.  Neuro surgery requesting consultation with ID for recs regarding abx for open skull fx.  Will place consult.
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
## 2019-12-19 NOTE — PHYSICAL THERAPY INITIAL EVALUATION ADULT - GENERAL OBSERVATIONS, REHAB EVAL
Pt received in bed, + IV Loc, + Right LE PRAFO, breathing on RA in NAD, brother present, in 2/10 head pain, agreeable to PT evaluation.
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
## 2019-12-20 ENCOUNTER — TRANSCRIPTION ENCOUNTER (OUTPATIENT)
Age: 24
End: 2019-12-20

## 2019-12-20 ENCOUNTER — INPATIENT (INPATIENT)
Facility: HOSPITAL | Age: 24
LOS: 13 days | Discharge: HOME | DRG: 950 | End: 2020-01-03
Attending: PHYSICAL MEDICINE & REHABILITATION | Admitting: PHYSICAL MEDICINE & REHABILITATION
Payer: COMMERCIAL

## 2019-12-20 VITALS
OXYGEN SATURATION: 97 % | HEART RATE: 59 BPM | DIASTOLIC BLOOD PRESSURE: 69 MMHG | TEMPERATURE: 98 F | SYSTOLIC BLOOD PRESSURE: 111 MMHG | RESPIRATION RATE: 16 BRPM

## 2019-12-20 VITALS
TEMPERATURE: 98 F | HEIGHT: 63 IN | SYSTOLIC BLOOD PRESSURE: 125 MMHG | RESPIRATION RATE: 14 BRPM | DIASTOLIC BLOOD PRESSURE: 78 MMHG | WEIGHT: 160.06 LBS | OXYGEN SATURATION: 99 % | HEART RATE: 67 BPM

## 2019-12-20 DIAGNOSIS — S06.6X0A TRAUMATIC SUBARACHNOID HEMORRHAGE WITHOUT LOSS OF CONSCIOUSNESS, INITIAL ENCOUNTER: ICD-10-CM

## 2019-12-20 PROCEDURE — 99233 SBSQ HOSP IP/OBS HIGH 50: CPT

## 2019-12-20 PROCEDURE — 99232 SBSQ HOSP IP/OBS MODERATE 35: CPT

## 2019-12-20 RX ORDER — ACETAMINOPHEN 500 MG
1000 TABLET ORAL EVERY 6 HOURS
Refills: 0 | Status: COMPLETED | OUTPATIENT
Start: 2019-12-20 | End: 2019-12-20

## 2019-12-20 RX ORDER — CEPHALEXIN 500 MG
1 CAPSULE ORAL
Qty: 36 | Refills: 0
Start: 2019-12-20 | End: 2019-12-28

## 2019-12-20 RX ORDER — LEVETIRACETAM 250 MG/1
500 TABLET, FILM COATED ORAL
Refills: 0 | Status: DISCONTINUED | OUTPATIENT
Start: 2019-12-20 | End: 2019-12-24

## 2019-12-20 RX ORDER — ACETAMINOPHEN 500 MG
650 TABLET ORAL EVERY 6 HOURS
Refills: 0 | Status: DISCONTINUED | OUTPATIENT
Start: 2019-12-20 | End: 2020-01-03

## 2019-12-20 RX ORDER — PANTOPRAZOLE SODIUM 20 MG/1
40 TABLET, DELAYED RELEASE ORAL
Refills: 0 | Status: DISCONTINUED | OUTPATIENT
Start: 2019-12-20 | End: 2020-01-03

## 2019-12-20 RX ORDER — CEPHALEXIN 500 MG
500 CAPSULE ORAL EVERY 6 HOURS
Refills: 0 | Status: DISCONTINUED | OUTPATIENT
Start: 2019-12-20 | End: 2019-12-23

## 2019-12-20 RX ORDER — ACETAMINOPHEN 500 MG
2 TABLET ORAL
Qty: 0 | Refills: 0 | DISCHARGE
Start: 2019-12-20

## 2019-12-20 RX ORDER — LEVETIRACETAM 250 MG/1
1 TABLET, FILM COATED ORAL
Qty: 8 | Refills: 0
Start: 2019-12-20

## 2019-12-20 RX ORDER — PANTOPRAZOLE SODIUM 20 MG/1
40 TABLET, DELAYED RELEASE ORAL
Refills: 0 | Status: DISCONTINUED | OUTPATIENT
Start: 2019-12-20 | End: 2019-12-20

## 2019-12-20 RX ORDER — ENOXAPARIN SODIUM 100 MG/ML
40 INJECTION SUBCUTANEOUS DAILY
Refills: 0 | Status: DISCONTINUED | OUTPATIENT
Start: 2019-12-20 | End: 2020-01-03

## 2019-12-20 RX ORDER — PANTOPRAZOLE SODIUM 20 MG/1
1 TABLET, DELAYED RELEASE ORAL
Qty: 0 | Refills: 0 | DISCHARGE
Start: 2019-12-20

## 2019-12-20 RX ORDER — GABAPENTIN 400 MG/1
1 CAPSULE ORAL
Qty: 30 | Refills: 0
Start: 2019-12-20

## 2019-12-20 RX ORDER — OXYCODONE HYDROCHLORIDE 5 MG/1
1 TABLET ORAL
Qty: 16 | Refills: 0
Start: 2019-12-20

## 2019-12-20 RX ORDER — BACITRACIN ZINC 500 UNIT/G
1 OINTMENT IN PACKET (EA) TOPICAL
Qty: 0 | Refills: 0 | DISCHARGE
Start: 2019-12-20

## 2019-12-20 RX ORDER — BACITRACIN ZINC 500 UNIT/G
1 OINTMENT IN PACKET (EA) TOPICAL EVERY 12 HOURS
Refills: 0 | Status: DISCONTINUED | OUTPATIENT
Start: 2019-12-20 | End: 2020-01-03

## 2019-12-20 RX ORDER — OXYCODONE HYDROCHLORIDE 5 MG/1
5 TABLET ORAL EVERY 6 HOURS
Refills: 0 | Status: DISCONTINUED | OUTPATIENT
Start: 2019-12-20 | End: 2019-12-26

## 2019-12-20 RX ORDER — GABAPENTIN 400 MG/1
300 CAPSULE ORAL EVERY 8 HOURS
Refills: 0 | Status: DISCONTINUED | OUTPATIENT
Start: 2019-12-20 | End: 2020-01-03

## 2019-12-20 RX ADMIN — OXYCODONE HYDROCHLORIDE 10 MILLIGRAM(S): 5 TABLET ORAL at 00:48

## 2019-12-20 RX ADMIN — OXYCODONE HYDROCHLORIDE 5 MILLIGRAM(S): 5 TABLET ORAL at 17:55

## 2019-12-20 RX ADMIN — GABAPENTIN 300 MILLIGRAM(S): 400 CAPSULE ORAL at 21:33

## 2019-12-20 RX ADMIN — OXYCODONE HYDROCHLORIDE 10 MILLIGRAM(S): 5 TABLET ORAL at 13:25

## 2019-12-20 RX ADMIN — OXYCODONE HYDROCHLORIDE 5 MILLIGRAM(S): 5 TABLET ORAL at 17:26

## 2019-12-20 RX ADMIN — OXYCODONE HYDROCHLORIDE 10 MILLIGRAM(S): 5 TABLET ORAL at 06:31

## 2019-12-20 RX ADMIN — ENOXAPARIN SODIUM 40 MILLIGRAM(S): 100 INJECTION SUBCUTANEOUS at 13:24

## 2019-12-20 RX ADMIN — LEVETIRACETAM 500 MILLIGRAM(S): 250 TABLET, FILM COATED ORAL at 18:28

## 2019-12-20 RX ADMIN — Medication 650 MILLIGRAM(S): at 00:48

## 2019-12-20 RX ADMIN — GABAPENTIN 300 MILLIGRAM(S): 400 CAPSULE ORAL at 13:25

## 2019-12-20 RX ADMIN — LEVETIRACETAM 500 MILLIGRAM(S): 250 TABLET, FILM COATED ORAL at 06:30

## 2019-12-20 RX ADMIN — OXYCODONE HYDROCHLORIDE 10 MILLIGRAM(S): 5 TABLET ORAL at 15:36

## 2019-12-20 RX ADMIN — Medication 500 MILLIGRAM(S): at 23:39

## 2019-12-20 RX ADMIN — Medication 650 MILLIGRAM(S): at 06:30

## 2019-12-20 RX ADMIN — GABAPENTIN 300 MILLIGRAM(S): 400 CAPSULE ORAL at 06:30

## 2019-12-20 RX ADMIN — Medication 100 MILLIGRAM(S): at 06:30

## 2019-12-20 RX ADMIN — PANTOPRAZOLE SODIUM 40 MILLIGRAM(S): 20 TABLET, DELAYED RELEASE ORAL at 06:30

## 2019-12-20 RX ADMIN — Medication 650 MILLIGRAM(S): at 07:30

## 2019-12-20 RX ADMIN — Medication 1 APPLICATION(S): at 07:32

## 2019-12-20 RX ADMIN — OXYCODONE HYDROCHLORIDE 10 MILLIGRAM(S): 5 TABLET ORAL at 07:30

## 2019-12-20 RX ADMIN — OXYCODONE HYDROCHLORIDE 5 MILLIGRAM(S): 5 TABLET ORAL at 23:39

## 2019-12-20 RX ADMIN — Medication 650 MILLIGRAM(S): at 21:25

## 2019-12-20 RX ADMIN — Medication 500 MILLIGRAM(S): at 18:27

## 2019-12-20 RX ADMIN — Medication 100 MILLIGRAM(S): at 13:24

## 2019-12-20 RX ADMIN — Medication 650 MILLIGRAM(S): at 19:28
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
## 2019-12-20 NOTE — H&P ADULT - NSHPPHYSICALEXAM_GEN_ALL_CORE
Constitutional - NAD, Comfortable  HEENT - cranial dressings intact, nasal dressing intact  Chest - No respiratory distress. No use of accessory muscles of respiration.  Cardiovascular - Warm, well perfused  Abdomen - nondistended  Extremities - RUE +cast, RLE in AFO, abrasions in left hand  Neurologic Exam -                    Cognitive - AAOx3     Communication - Fluent, No dysarthria. Intact naming, comprehension, and repetition     Memory - recalled 3/3 after 5min     Concentration - able to recite reverse months     Motor -                     LEFT    UE - 5/5                    RIGHT UE - ShAB 5/5, splint in place, fingers movements intact                    LEFT    LE - 5/5                    RIGHT LE - HF 4/5, KE 5/5, DF 0/5, PF 4/5       Sensory - decreased to light touch in left leg  Psychiatric - Mood stable, Affect WNL  + clonus Constitutional - NAD, Comfortable  HEENT - left parietal incision c/d/i staples, left ear lac repaired with sutures,  nasal dressing intact  Chest - No respiratory distress. No use of accessory muscles of respiration.  Cardiovascular - Warm, well perfused  Abdomen - nondistended  Extremities - RUE +cast, RLE in AFO, abrasions in left hand  Neurologic Exam -                    Cognitive - AAOx3     Communication - Fluent, No dysarthria. Intact naming, comprehension, and repetition     Memory - recalled 3/3 after 5min     Concentration - able to recite reverse days of week      Motor -                     LEFT    UE - 5/5                    RIGHT UE - ShAB 5/5, splint in place, fingers movements intact                    LEFT    LE - 5/5                    RIGHT LE - HF 4/5, KE 5/5, DF 0/5, PF 4/5       Sensory - decreased to light touch in left leg  Psychiatric - Mood stable, Affect WNL  + clonus
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
## 2019-12-20 NOTE — DISCHARGE NOTE NURSING/CASE MANAGEMENT/SOCIAL WORK - PATIENT PORTAL LINK FT
You can access the FollowMyHealth Patient Portal offered by [facility] by registering at the following website: [email or web address]. By joining [facility]’s FollowMyHealth portal, you will also be able to view your health information using other applications (apps) compatible with our system.

## 2019-12-20 NOTE — H&P ADULT - NSHPREVIEWOFSYSTEMS_GEN_ALL_CORE
REVIEW OF SYSTEMS  Constitutional - Denies fevers, chills  HEENT - Denies changes in vision or hearing  Respiratory - Denies cough, dyspnea  Cardiovascular - Denies chest pain, palpitations  Gastrointestinal - Denies n/v, constipation, bowel incontinence  Genitourinary - Denies dysuria, urinary incontinence  Neurological - Denies weakness, numbness, headaches  Skin - Denies rashes  Musculoskeletal - Denies arthralgia, myalgias, back pain  Psychiatric - Denies depressed mood, anxiety REVIEW OF SYSTEMS  Constitutional - Denies fevers, chills  HEENT - Recent head surgery, nasal surgery, Denies changes in vision or hearing  Respiratory - Denies cough, dyspnea, mouth breathing due to nasal surgery  Cardiovascular - Denies chest pain, palpitations  Gastrointestinal - Denies n/v, constipation, bowel incontinence, last BM yesterday   Genitourinary - Denies dysuria, urinary incontinence  Neurological - headache currently 8/10 oxycodone helps, right arm pain 7-8/10 worse during transit to rehab.   Skin - some cuts/abrasion on left hand  Musculoskeletal - right arm pain   Psychiatric - Denies depressed mood, anxiety

## 2019-12-20 NOTE — H&P ADULT - NSHPSOCIALHISTORY_GEN_ALL_CORE
SOCIAL HISTORY  Lives with mother and brother in , no stairs. Mother is home and able to assist.  PTA Independent in ambulation and ADLs. +Drive. 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

## 2019-12-20 NOTE — PROGRESS NOTE ADULT - SUBJECTIVE AND OBJECTIVE BOX
INTERVAL HPI/OVERNIGHT EVENTS:    Patient complaining of epigastric pain. no chest pain or associated Sob. Non-tender to palpation and fees like something is pushing up in that area. Started on protonix for now, will continue to re-evaluate patient. no other acute issues overnight. Pain under control. Pt denies chest pain, SOB, n/v/f/c.     MEDICATIONS  (STANDING):  acetaminophen   Tablet .. 650 milliGRAM(s) Oral every 6 hours  BACItracin   Ointment 1 Application(s) Topical every 12 hours  ceFAZolin   IVPB 2000 milliGRAM(s) IV Intermittent every 8 hours  enoxaparin Injectable 40 milliGRAM(s) SubCutaneous daily  gabapentin 300 milliGRAM(s) Oral every 8 hours  levETIRAcetam 500 milliGRAM(s) Oral two times a day  pantoprazole    Tablet 40 milliGRAM(s) Oral before breakfast    MEDICATIONS  (PRN):  ondansetron Injectable 4 milliGRAM(s) IV Push once PRN Nausea and/or Vomiting  oxyCODONE    IR 5 milliGRAM(s) Oral every 4 hours PRN Moderate Pain (4 - 6)  oxyCODONE    IR 10 milliGRAM(s) Oral every 6 hours PRN Severe Pain (7 - 10)      Vital Signs Last 24 Hrs  T(C): 36.6 (20 Dec 2019 04:52), Max: 37.2 (19 Dec 2019 19:29)  T(F): 97.9 (20 Dec 2019 04:52), Max: 98.9 (19 Dec 2019 19:29)  HR: 58 (20 Dec 2019 04:52) (50 - 63)  BP: 105/69 (20 Dec 2019 04:52) (105/69 - 122/66)  BP(mean): --  RR: 18 (20 Dec 2019 04:52) (18 - 18)  SpO2: 97% (20 Dec 2019 04:52) (90% - 98%)    Physical Exam:  Constitutional: patient resting comfortably in bed, in no acute distress  HEENT: EOMI / PERRL b/l, no active drainage or redness. Head wrapped with Kerlix postop from Cranioplasty. Nasal splint in place with dressing postop.   Neck: No JVD, full ROM without pain. Dresing in place s/p central line placement. No central line present.   Respiratory: respirations are unlabored, no accessory muscle use, no conversational dyspnea.   Cardiovascular: regular rate & rhythm  Gastrointestinal: Abdomen soft, non-tender, non-distended, no rebound tenderness / guarding.   Neurological: GCS: 15 (4/5/6). A&O x 3; No dorsal flexion on R foot. Flexion and extension present of R leg and knee. Flexion of R foot present.   Psychiatric: Normal mood, normal affect, worried about his functional status on the R foot.   Musculoskeletal: R arm splinted for R scaphoid fracture. R leg with a boot for foot drop. normal sensation and ROM of distal digits   Skin laceration in the L ear lobe and R fore arm repaired in the trauma bay on arrival.         I&O's Detail    18 Dec 2019 07:01  -  19 Dec 2019 07:00  --------------------------------------------------------  IN:    lactated ringers.: 200 mL    Solution: 200 mL    Solution: 100 mL  Total IN: 500 mL    OUT:    Voided: 900 mL  Total OUT: 900 mL    Total NET: -400 mL      19 Dec 2019 07:01  -  20 Dec 2019 05:15  --------------------------------------------------------  IN:    Oral Fluid: 450 mL    Solution: 50 mL  Total IN: 500 mL    OUT:    Voided: 700 mL  Total OUT: 700 mL    Total NET: -200 mL          LABS:                RADIOLOGY & ADDITIONAL STUDIES:
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
## 2019-12-20 NOTE — H&P ADULT - ATTENDING COMMENTS
Patient seen and examined. Agree with note/plan.   PT/OT/Speech eval  PRAFO for foot drop  labs/vitals stable
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
## 2019-12-21 LAB
ALBUMIN SERPL ELPH-MCNC: 3.5 G/DL — SIGNIFICANT CHANGE UP (ref 3.3–5)
ALP SERPL-CCNC: 108 U/L — SIGNIFICANT CHANGE UP (ref 40–120)
ALT FLD-CCNC: 87 U/L — HIGH (ref 10–45)
ANION GAP SERPL CALC-SCNC: 7 MMOL/L — SIGNIFICANT CHANGE UP (ref 5–17)
AST SERPL-CCNC: 58 U/L — HIGH (ref 10–40)
BASOPHILS # BLD AUTO: 0.04 K/UL — SIGNIFICANT CHANGE UP (ref 0–0.2)
BASOPHILS NFR BLD AUTO: 0.4 % — SIGNIFICANT CHANGE UP (ref 0–2)
BILIRUB SERPL-MCNC: 0.6 MG/DL — SIGNIFICANT CHANGE UP (ref 0.2–1.2)
BUN SERPL-MCNC: 12 MG/DL — SIGNIFICANT CHANGE UP (ref 7–23)
CALCIUM SERPL-MCNC: 9.5 MG/DL — SIGNIFICANT CHANGE UP (ref 8.4–10.5)
CHLORIDE SERPL-SCNC: 101 MMOL/L — SIGNIFICANT CHANGE UP (ref 96–108)
CO2 SERPL-SCNC: 31 MMOL/L — SIGNIFICANT CHANGE UP (ref 22–31)
CREAT SERPL-MCNC: 1.01 MG/DL — SIGNIFICANT CHANGE UP (ref 0.5–1.3)
EOSINOPHIL # BLD AUTO: 0.29 K/UL — SIGNIFICANT CHANGE UP (ref 0–0.5)
EOSINOPHIL NFR BLD AUTO: 3.1 % — SIGNIFICANT CHANGE UP (ref 0–6)
GLUCOSE SERPL-MCNC: 84 MG/DL — SIGNIFICANT CHANGE UP (ref 70–99)
HCT VFR BLD CALC: 42.9 % — SIGNIFICANT CHANGE UP (ref 39–50)
HGB BLD-MCNC: 15.3 G/DL — SIGNIFICANT CHANGE UP (ref 13–17)
IMM GRANULOCYTES NFR BLD AUTO: 0.9 % — SIGNIFICANT CHANGE UP (ref 0–1.5)
LYMPHOCYTES # BLD AUTO: 1.71 K/UL — SIGNIFICANT CHANGE UP (ref 1–3.3)
LYMPHOCYTES # BLD AUTO: 18.2 % — SIGNIFICANT CHANGE UP (ref 13–44)
MCHC RBC-ENTMCNC: 31.7 PG — SIGNIFICANT CHANGE UP (ref 27–34)
MCHC RBC-ENTMCNC: 35.7 GM/DL — SIGNIFICANT CHANGE UP (ref 32–36)
MCV RBC AUTO: 89 FL — SIGNIFICANT CHANGE UP (ref 80–100)
MONOCYTES # BLD AUTO: 0.87 K/UL — SIGNIFICANT CHANGE UP (ref 0–0.9)
MONOCYTES NFR BLD AUTO: 9.2 % — SIGNIFICANT CHANGE UP (ref 2–14)
NEUTROPHILS # BLD AUTO: 6.42 K/UL — SIGNIFICANT CHANGE UP (ref 1.8–7.4)
NEUTROPHILS NFR BLD AUTO: 68.2 % — SIGNIFICANT CHANGE UP (ref 43–77)
NRBC # BLD: 0 /100 WBCS — SIGNIFICANT CHANGE UP (ref 0–0)
PLATELET # BLD AUTO: 261 K/UL — SIGNIFICANT CHANGE UP (ref 150–400)
POTASSIUM SERPL-MCNC: 3.6 MMOL/L — SIGNIFICANT CHANGE UP (ref 3.5–5.3)
POTASSIUM SERPL-SCNC: 3.6 MMOL/L — SIGNIFICANT CHANGE UP (ref 3.5–5.3)
PROT SERPL-MCNC: 7.4 G/DL — SIGNIFICANT CHANGE UP (ref 6–8.3)
RBC # BLD: 4.82 M/UL — SIGNIFICANT CHANGE UP (ref 4.2–5.8)
RBC # FLD: 11.6 % — SIGNIFICANT CHANGE UP (ref 10.3–14.5)
SODIUM SERPL-SCNC: 139 MMOL/L — SIGNIFICANT CHANGE UP (ref 135–145)
WBC # BLD: 9.41 K/UL — SIGNIFICANT CHANGE UP (ref 3.8–10.5)
WBC # FLD AUTO: 9.41 K/UL — SIGNIFICANT CHANGE UP (ref 3.8–10.5)

## 2019-12-21 PROCEDURE — 99233 SBSQ HOSP IP/OBS HIGH 50: CPT | Mod: GC

## 2019-12-21 PROCEDURE — 99255 IP/OBS CONSLTJ NEW/EST HI 80: CPT

## 2019-12-21 RX ORDER — OXYCODONE HYDROCHLORIDE 5 MG/1
5 TABLET ORAL ONCE
Refills: 0 | Status: DISCONTINUED | OUTPATIENT
Start: 2019-12-21 | End: 2019-12-21

## 2019-12-21 RX ADMIN — Medication 650 MILLIGRAM(S): at 04:40

## 2019-12-21 RX ADMIN — GABAPENTIN 300 MILLIGRAM(S): 400 CAPSULE ORAL at 14:18

## 2019-12-21 RX ADMIN — LEVETIRACETAM 500 MILLIGRAM(S): 250 TABLET, FILM COATED ORAL at 06:25

## 2019-12-21 RX ADMIN — OXYCODONE HYDROCHLORIDE 5 MILLIGRAM(S): 5 TABLET ORAL at 00:44

## 2019-12-21 RX ADMIN — OXYCODONE HYDROCHLORIDE 5 MILLIGRAM(S): 5 TABLET ORAL at 20:42

## 2019-12-21 RX ADMIN — ENOXAPARIN SODIUM 40 MILLIGRAM(S): 100 INJECTION SUBCUTANEOUS at 12:20

## 2019-12-21 RX ADMIN — Medication 650 MILLIGRAM(S): at 03:16

## 2019-12-21 RX ADMIN — Medication 650 MILLIGRAM(S): at 23:58

## 2019-12-21 RX ADMIN — Medication 500 MILLIGRAM(S): at 06:25

## 2019-12-21 RX ADMIN — OXYCODONE HYDROCHLORIDE 5 MILLIGRAM(S): 5 TABLET ORAL at 22:10

## 2019-12-21 RX ADMIN — Medication 500 MILLIGRAM(S): at 23:56

## 2019-12-21 RX ADMIN — PANTOPRAZOLE SODIUM 40 MILLIGRAM(S): 20 TABLET, DELAYED RELEASE ORAL at 06:25

## 2019-12-21 RX ADMIN — OXYCODONE HYDROCHLORIDE 5 MILLIGRAM(S): 5 TABLET ORAL at 06:27

## 2019-12-21 RX ADMIN — Medication 650 MILLIGRAM(S): at 23:56

## 2019-12-21 RX ADMIN — Medication 500 MILLIGRAM(S): at 18:22

## 2019-12-21 RX ADMIN — GABAPENTIN 300 MILLIGRAM(S): 400 CAPSULE ORAL at 22:17

## 2019-12-21 RX ADMIN — OXYCODONE HYDROCHLORIDE 5 MILLIGRAM(S): 5 TABLET ORAL at 13:00

## 2019-12-21 RX ADMIN — Medication 1 APPLICATION(S): at 18:22

## 2019-12-21 RX ADMIN — Medication 1 APPLICATION(S): at 06:27

## 2019-12-21 RX ADMIN — OXYCODONE HYDROCHLORIDE 5 MILLIGRAM(S): 5 TABLET ORAL at 06:25

## 2019-12-21 RX ADMIN — OXYCODONE HYDROCHLORIDE 5 MILLIGRAM(S): 5 TABLET ORAL at 20:55

## 2019-12-21 RX ADMIN — OXYCODONE HYDROCHLORIDE 5 MILLIGRAM(S): 5 TABLET ORAL at 12:26

## 2019-12-21 RX ADMIN — GABAPENTIN 300 MILLIGRAM(S): 400 CAPSULE ORAL at 06:25

## 2019-12-21 RX ADMIN — Medication 500 MILLIGRAM(S): at 12:20

## 2019-12-21 RX ADMIN — LEVETIRACETAM 500 MILLIGRAM(S): 250 TABLET, FILM COATED ORAL at 18:22

## 2019-12-21 NOTE — DIETITIAN INITIAL EVALUATION ADULT. - REASON INDICATOR FOR ASSESSMENT
Initial Assessment  Patient Speaks Citizen of [place], RD is Fluent in Citizen of [place]

## 2019-12-21 NOTE — CHART NOTE - NSCHARTNOTEFT_GEN_A_CORE
Upon Nutritional Assessment by the Registered Dietitian Your Patient was Determined to Meet criteria/has Evidence of the Following Diagnosis:          [X]  Acute Mild Protein-Calorie Malnutrition    Findings as based on:  [X] Comprehensive Nutrition Assessment   [X] Nutrition Focused Physical Exam - Temporalis, Orbital Fat Pads, Buccal Fat Pads, Clavicle, Bicep, Tricep, Quadricep, Gastrocnemius(Calf) & Interosseous(Hand) Wasting/Depletion Observed  [X] Other: Poor PO Intake 5+ Days      Nutrition Plan/Recommendations:    1) Ensure Enlive 8oz PO Daily (Provides 350kcal & 20grams of Protein)     PROVIDER Section:   By Signing This Assessment You Are Acknowledging & Agree with The Diagnosis Assigned by the Registered Dietitian    Lauri Bridges RDN Upon Nutritional Assessment by the Registered Dietitian Your Patient was Determined to Meet criteria/has Evidence of the Following Diagnosis:          [X]  Acute Mild Protein-Calorie Malnutrition    Findings as based on:  [X] Comprehensive Nutrition Assessment   [X] Nutrition Focused Physical Exam - Temporalis & Orbital Fat Pads Wasting/Depletion Observed  [X] Other: Poor PO Intake 5+ Days      Nutrition Plan/Recommendations:    1) Ensure Enlive 8oz PO Daily (Provides 350kcal & 20grams of Protein)     PROVIDER Section:   By Signing This Assessment You Are Acknowledging & Agree with The Diagnosis Assigned by the Registered Dietitian    Lauri Bridges RDN
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
## 2019-12-21 NOTE — DIETITIAN INITIAL EVALUATION ADULT. - ENERGY NEEDS
Height: 63Inches   Weight: 160lb   Body Mass Index (BMI): 28.4kg/m2   Ideal Body Weight Range: 124lb (+/-10%)   Percent Ideal Body Weight ~129%

## 2019-12-21 NOTE — DIETITIAN INITIAL EVALUATION ADULT. - ADD RECOMMEND
1) Monitor Weights, Intake, Tolerance, Skin & Labwork   2) Ensure Enlive 8oz PO Daily (Provides 350kcal & 20grams of Protein) 3) Education Provided on Need for Supplementation 4) Continue Nutrition Plan of Care

## 2019-12-21 NOTE — DIETITIAN INITIAL EVALUATION ADULT. - DIET TYPE
on Regular Diet w/ Thin Liquids   Recommend Initiate Ensure Enlive 8oz PO Daily   Education Provided on Need for Supplementation   Patient Does Not Follow Diet @Home, Consumes 3Meals a Day & Doesn't Take Vitamin/Supplements @Home regular/Ensure Enlive 8oz PO Daily (Provides 350kcal & 20grams of Protein)/supplement (specify)
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
## 2019-12-21 NOTE — DIETITIAN INITIAL EVALUATION ADULT. - MALNUTRITION
Malnutrition Alert Placed in Electronic Medical Record - Pending Signature Moderate Malnutrition Mild Malnutrition

## 2019-12-22 LAB
CULTURE RESULTS: SIGNIFICANT CHANGE UP
SPECIMEN SOURCE: SIGNIFICANT CHANGE UP

## 2019-12-22 PROCEDURE — 99232 SBSQ HOSP IP/OBS MODERATE 35: CPT | Mod: GC

## 2019-12-22 PROCEDURE — 73090 X-RAY EXAM OF FOREARM: CPT | Mod: 26,LT

## 2019-12-22 PROCEDURE — 99232 SBSQ HOSP IP/OBS MODERATE 35: CPT

## 2019-12-22 RX ORDER — SENNA PLUS 8.6 MG/1
2 TABLET ORAL AT BEDTIME
Refills: 0 | Status: DISCONTINUED | OUTPATIENT
Start: 2019-12-22 | End: 2020-01-03

## 2019-12-22 RX ORDER — POLYETHYLENE GLYCOL 3350 17 G/17G
17 POWDER, FOR SOLUTION ORAL DAILY
Refills: 0 | Status: DISCONTINUED | OUTPATIENT
Start: 2019-12-22 | End: 2020-01-03

## 2019-12-22 RX ADMIN — Medication 1 APPLICATION(S): at 07:04

## 2019-12-22 RX ADMIN — Medication 650 MILLIGRAM(S): at 23:45

## 2019-12-22 RX ADMIN — OXYCODONE HYDROCHLORIDE 5 MILLIGRAM(S): 5 TABLET ORAL at 13:00

## 2019-12-22 RX ADMIN — GABAPENTIN 300 MILLIGRAM(S): 400 CAPSULE ORAL at 05:10

## 2019-12-22 RX ADMIN — Medication 650 MILLIGRAM(S): at 16:57

## 2019-12-22 RX ADMIN — Medication 500 MILLIGRAM(S): at 23:01

## 2019-12-22 RX ADMIN — GABAPENTIN 300 MILLIGRAM(S): 400 CAPSULE ORAL at 21:46

## 2019-12-22 RX ADMIN — OXYCODONE HYDROCHLORIDE 5 MILLIGRAM(S): 5 TABLET ORAL at 23:01

## 2019-12-22 RX ADMIN — GABAPENTIN 300 MILLIGRAM(S): 400 CAPSULE ORAL at 14:18

## 2019-12-22 RX ADMIN — OXYCODONE HYDROCHLORIDE 5 MILLIGRAM(S): 5 TABLET ORAL at 07:08

## 2019-12-22 RX ADMIN — SENNA PLUS 2 TABLET(S): 8.6 TABLET ORAL at 21:46

## 2019-12-22 RX ADMIN — Medication 650 MILLIGRAM(S): at 07:08

## 2019-12-22 RX ADMIN — OXYCODONE HYDROCHLORIDE 5 MILLIGRAM(S): 5 TABLET ORAL at 08:00

## 2019-12-22 RX ADMIN — LEVETIRACETAM 500 MILLIGRAM(S): 250 TABLET, FILM COATED ORAL at 05:09

## 2019-12-22 RX ADMIN — Medication 1 APPLICATION(S): at 17:15

## 2019-12-22 RX ADMIN — Medication 650 MILLIGRAM(S): at 17:15

## 2019-12-22 RX ADMIN — Medication 500 MILLIGRAM(S): at 17:13

## 2019-12-22 RX ADMIN — Medication 650 MILLIGRAM(S): at 23:01

## 2019-12-22 RX ADMIN — OXYCODONE HYDROCHLORIDE 5 MILLIGRAM(S): 5 TABLET ORAL at 23:45

## 2019-12-22 RX ADMIN — Medication 500 MILLIGRAM(S): at 12:10

## 2019-12-22 RX ADMIN — Medication 650 MILLIGRAM(S): at 08:00

## 2019-12-22 RX ADMIN — LEVETIRACETAM 500 MILLIGRAM(S): 250 TABLET, FILM COATED ORAL at 17:13

## 2019-12-22 RX ADMIN — OXYCODONE HYDROCHLORIDE 5 MILLIGRAM(S): 5 TABLET ORAL at 12:16

## 2019-12-22 RX ADMIN — PANTOPRAZOLE SODIUM 40 MILLIGRAM(S): 20 TABLET, DELAYED RELEASE ORAL at 05:09

## 2019-12-22 RX ADMIN — Medication 500 MILLIGRAM(S): at 07:03

## 2019-12-22 RX ADMIN — ENOXAPARIN SODIUM 40 MILLIGRAM(S): 100 INJECTION SUBCUTANEOUS at 12:10
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
## 2019-12-23 PROCEDURE — 99232 SBSQ HOSP IP/OBS MODERATE 35: CPT

## 2019-12-23 PROCEDURE — 99232 SBSQ HOSP IP/OBS MODERATE 35: CPT | Mod: 24

## 2019-12-23 RX ADMIN — GABAPENTIN 300 MILLIGRAM(S): 400 CAPSULE ORAL at 19:55

## 2019-12-23 RX ADMIN — Medication 650 MILLIGRAM(S): at 08:19

## 2019-12-23 RX ADMIN — Medication 1 APPLICATION(S): at 05:19

## 2019-12-23 RX ADMIN — OXYCODONE HYDROCHLORIDE 5 MILLIGRAM(S): 5 TABLET ORAL at 06:05

## 2019-12-23 RX ADMIN — OXYCODONE HYDROCHLORIDE 5 MILLIGRAM(S): 5 TABLET ORAL at 13:07

## 2019-12-23 RX ADMIN — POLYETHYLENE GLYCOL 3350 17 GRAM(S): 17 POWDER, FOR SOLUTION ORAL at 12:11

## 2019-12-23 RX ADMIN — Medication 650 MILLIGRAM(S): at 23:46

## 2019-12-23 RX ADMIN — GABAPENTIN 300 MILLIGRAM(S): 400 CAPSULE ORAL at 05:16

## 2019-12-23 RX ADMIN — Medication 650 MILLIGRAM(S): at 11:10

## 2019-12-23 RX ADMIN — Medication 650 MILLIGRAM(S): at 23:45

## 2019-12-23 RX ADMIN — SENNA PLUS 2 TABLET(S): 8.6 TABLET ORAL at 19:55

## 2019-12-23 RX ADMIN — OXYCODONE HYDROCHLORIDE 5 MILLIGRAM(S): 5 TABLET ORAL at 20:30

## 2019-12-23 RX ADMIN — Medication 500 MILLIGRAM(S): at 05:16

## 2019-12-23 RX ADMIN — GABAPENTIN 300 MILLIGRAM(S): 400 CAPSULE ORAL at 12:07

## 2019-12-23 RX ADMIN — ENOXAPARIN SODIUM 40 MILLIGRAM(S): 100 INJECTION SUBCUTANEOUS at 12:08

## 2019-12-23 RX ADMIN — LEVETIRACETAM 500 MILLIGRAM(S): 250 TABLET, FILM COATED ORAL at 16:57

## 2019-12-23 RX ADMIN — OXYCODONE HYDROCHLORIDE 5 MILLIGRAM(S): 5 TABLET ORAL at 12:07

## 2019-12-23 RX ADMIN — OXYCODONE HYDROCHLORIDE 5 MILLIGRAM(S): 5 TABLET ORAL at 05:19

## 2019-12-23 RX ADMIN — Medication 500 MILLIGRAM(S): at 12:07

## 2019-12-23 RX ADMIN — LEVETIRACETAM 500 MILLIGRAM(S): 250 TABLET, FILM COATED ORAL at 05:19

## 2019-12-23 RX ADMIN — OXYCODONE HYDROCHLORIDE 5 MILLIGRAM(S): 5 TABLET ORAL at 19:55

## 2019-12-23 RX ADMIN — PANTOPRAZOLE SODIUM 40 MILLIGRAM(S): 20 TABLET, DELAYED RELEASE ORAL at 05:16

## 2019-12-23 RX ADMIN — Medication 1 APPLICATION(S): at 16:55
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
## 2019-12-23 NOTE — PROGRESS NOTE ADULT - COMMENTS
Patient seen with OT assisting in Grenadian translation. RH dominant. Denies H/A, dizziness. +Nasal splint in place, understands not to blow nose, uses wet tissue if needed, declines nasal spray. Has some mild superficial disocmfort around ear but stable, no inner ear pain, no eye pain. Alert NAD

## 2019-12-23 NOTE — PROGRESS NOTE ADULT - EXTREMITIES COMMENTS
no calf swelling +soft, NT no erythema  no tremors  right bulky dressing UE for scaphoid fx  trace finger swelling, AROm WFL II-V
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
## 2019-12-23 NOTE — PROGRESS NOTE ADULT - CONSTITUTIONAL COMMENTS
alert, no facial droop. Trace swelling inferior to left ear, no hematoma noted, no fluctuance, healing abrasions. O x e GCS=14 good attention follows complex commands

## 2019-12-24 PROCEDURE — 99231 SBSQ HOSP IP/OBS SF/LOW 25: CPT | Mod: 24

## 2019-12-24 RX ADMIN — Medication 650 MILLIGRAM(S): at 18:00

## 2019-12-24 RX ADMIN — Medication 1 APPLICATION(S): at 05:44

## 2019-12-24 RX ADMIN — Medication 1 APPLICATION(S): at 18:11

## 2019-12-24 RX ADMIN — OXYCODONE HYDROCHLORIDE 5 MILLIGRAM(S): 5 TABLET ORAL at 13:15

## 2019-12-24 RX ADMIN — OXYCODONE HYDROCHLORIDE 5 MILLIGRAM(S): 5 TABLET ORAL at 06:14

## 2019-12-24 RX ADMIN — GABAPENTIN 300 MILLIGRAM(S): 400 CAPSULE ORAL at 14:48

## 2019-12-24 RX ADMIN — OXYCODONE HYDROCHLORIDE 5 MILLIGRAM(S): 5 TABLET ORAL at 12:30

## 2019-12-24 RX ADMIN — OXYCODONE HYDROCHLORIDE 5 MILLIGRAM(S): 5 TABLET ORAL at 05:44

## 2019-12-24 RX ADMIN — Medication 650 MILLIGRAM(S): at 08:28

## 2019-12-24 RX ADMIN — OXYCODONE HYDROCHLORIDE 5 MILLIGRAM(S): 5 TABLET ORAL at 18:09

## 2019-12-24 RX ADMIN — ENOXAPARIN SODIUM 40 MILLIGRAM(S): 100 INJECTION SUBCUTANEOUS at 12:31

## 2019-12-24 RX ADMIN — POLYETHYLENE GLYCOL 3350 17 GRAM(S): 17 POWDER, FOR SOLUTION ORAL at 12:31

## 2019-12-24 RX ADMIN — OXYCODONE HYDROCHLORIDE 5 MILLIGRAM(S): 5 TABLET ORAL at 18:00

## 2019-12-24 RX ADMIN — Medication 650 MILLIGRAM(S): at 18:10

## 2019-12-24 RX ADMIN — LEVETIRACETAM 500 MILLIGRAM(S): 250 TABLET, FILM COATED ORAL at 05:44

## 2019-12-24 RX ADMIN — Medication 650 MILLIGRAM(S): at 09:15

## 2019-12-24 RX ADMIN — PANTOPRAZOLE SODIUM 40 MILLIGRAM(S): 20 TABLET, DELAYED RELEASE ORAL at 05:44

## 2019-12-24 RX ADMIN — GABAPENTIN 300 MILLIGRAM(S): 400 CAPSULE ORAL at 05:44

## 2019-12-24 RX ADMIN — GABAPENTIN 300 MILLIGRAM(S): 400 CAPSULE ORAL at 21:12

## 2019-12-24 RX ADMIN — SENNA PLUS 2 TABLET(S): 8.6 TABLET ORAL at 21:12
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
## 2019-12-24 NOTE — PROGRESS NOTE ADULT - EXTREMITIES COMMENTS
no calf swelling =soft, NT no erythema or wwarmth in calves  no pedal edema  with right knee brace and ACE wrapping, reduced right LE advancement but no recurvatum, no toe drag. balance and endurnace fair+, walking with CS/CG in PT  right hand bulky dressing/splint for scaphoid fx
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
## 2019-12-24 NOTE — PROGRESS NOTE ADULT - COMMENTS
Patient doing well, wonders when he will be able to remove nasal splilnt. No H/A. seen walking in PT, requires brace for knee due to instability and recurvatum as well as foot drop.

## 2019-12-25 PROCEDURE — 99232 SBSQ HOSP IP/OBS MODERATE 35: CPT

## 2019-12-25 RX ADMIN — OXYCODONE HYDROCHLORIDE 5 MILLIGRAM(S): 5 TABLET ORAL at 07:00

## 2019-12-25 RX ADMIN — OXYCODONE HYDROCHLORIDE 5 MILLIGRAM(S): 5 TABLET ORAL at 20:00

## 2019-12-25 RX ADMIN — ENOXAPARIN SODIUM 40 MILLIGRAM(S): 100 INJECTION SUBCUTANEOUS at 11:20

## 2019-12-25 RX ADMIN — OXYCODONE HYDROCHLORIDE 5 MILLIGRAM(S): 5 TABLET ORAL at 19:57

## 2019-12-25 RX ADMIN — PANTOPRAZOLE SODIUM 40 MILLIGRAM(S): 20 TABLET, DELAYED RELEASE ORAL at 04:39

## 2019-12-25 RX ADMIN — Medication 650 MILLIGRAM(S): at 11:28

## 2019-12-25 RX ADMIN — OXYCODONE HYDROCHLORIDE 5 MILLIGRAM(S): 5 TABLET ORAL at 11:28

## 2019-12-25 RX ADMIN — OXYCODONE HYDROCHLORIDE 5 MILLIGRAM(S): 5 TABLET ORAL at 04:39

## 2019-12-25 RX ADMIN — OXYCODONE HYDROCHLORIDE 5 MILLIGRAM(S): 5 TABLET ORAL at 12:00

## 2019-12-25 RX ADMIN — GABAPENTIN 300 MILLIGRAM(S): 400 CAPSULE ORAL at 22:00

## 2019-12-25 RX ADMIN — Medication 1 APPLICATION(S): at 04:40

## 2019-12-25 RX ADMIN — POLYETHYLENE GLYCOL 3350 17 GRAM(S): 17 POWDER, FOR SOLUTION ORAL at 11:20

## 2019-12-25 RX ADMIN — SENNA PLUS 2 TABLET(S): 8.6 TABLET ORAL at 22:00

## 2019-12-25 RX ADMIN — Medication 650 MILLIGRAM(S): at 12:00

## 2019-12-25 RX ADMIN — GABAPENTIN 300 MILLIGRAM(S): 400 CAPSULE ORAL at 11:21

## 2019-12-25 RX ADMIN — Medication 1 APPLICATION(S): at 17:27

## 2019-12-25 RX ADMIN — GABAPENTIN 300 MILLIGRAM(S): 400 CAPSULE ORAL at 04:39

## 2019-12-25 NOTE — PROGRESS NOTE ADULT - SUBJECTIVE AND OBJECTIVE BOX
Cc: Gait dysfunction    HPI: Patient with no new medical issues today.  Pain controlled, no chest pain, no N/V, no Fevers/Chills. No other new ROS  Has been tolerating rehabilitation program.    acetaminophen   Tablet .. 650 milliGRAM(s) Oral every 6 hours PRN  BACItracin   Ointment 1 Application(s) Topical every 12 hours  bisacodyl Suppository 10 milliGRAM(s) Rectal daily PRN  enoxaparin Injectable 40 milliGRAM(s) SubCutaneous daily  gabapentin 300 milliGRAM(s) Oral every 8 hours  oxyCODONE    IR 5 milliGRAM(s) Oral every 6 hours PRN  pantoprazole    Tablet 40 milliGRAM(s) Oral before breakfast  polyethylene glycol 3350 17 Gram(s) Oral daily  senna 2 Tablet(s) Oral at bedtime    T(C): 36.2 (12-25-19 @ 08:27), Max: 36.2 (12-25-19 @ 08:27)  HR: 55 (12-25-19 @ 08:27) (55 - 55)  BP: 107/68 (12-25-19 @ 08:27) (107/68 - 107/68)  RR: 14 (12-25-19 @ 08:27) (14 - 14)  SpO2: 99% (12-25-19 @ 08:27) (99% - 99%)    In NAD  HEENT- EOMI  Heart- RRR, S1S2  Lungs- CTA bl.  Abd- + BS, NT  Ext- No calf pain  Neuro- Exam unchanged    Imp: Patient with diagnosis of SAH/IPH, scaphoid fx, nasal fx  admitted for comprehensive acute rehabilitation.    Plan:  - Continue PT/OT/SLP  - DVT prophylaxis- Lovenox  - Pain- Gabapentin  - Skin- Turn q2h, check skin daily  - Continue current medications; patient medically stable.   - Patient is stable to continue current rehabilitation program.
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
## 2019-12-26 PROCEDURE — 99231 SBSQ HOSP IP/OBS SF/LOW 25: CPT

## 2019-12-26 RX ORDER — OXYCODONE HYDROCHLORIDE 5 MG/1
5 TABLET ORAL EVERY 6 HOURS
Refills: 0 | Status: DISCONTINUED | OUTPATIENT
Start: 2019-12-26 | End: 2019-12-27

## 2019-12-26 RX ADMIN — OXYCODONE HYDROCHLORIDE 5 MILLIGRAM(S): 5 TABLET ORAL at 03:28

## 2019-12-26 RX ADMIN — OXYCODONE HYDROCHLORIDE 5 MILLIGRAM(S): 5 TABLET ORAL at 20:04

## 2019-12-26 RX ADMIN — GABAPENTIN 300 MILLIGRAM(S): 400 CAPSULE ORAL at 20:04

## 2019-12-26 RX ADMIN — ENOXAPARIN SODIUM 40 MILLIGRAM(S): 100 INJECTION SUBCUTANEOUS at 11:52

## 2019-12-26 RX ADMIN — GABAPENTIN 300 MILLIGRAM(S): 400 CAPSULE ORAL at 05:41

## 2019-12-26 RX ADMIN — OXYCODONE HYDROCHLORIDE 5 MILLIGRAM(S): 5 TABLET ORAL at 20:34

## 2019-12-26 RX ADMIN — OXYCODONE HYDROCHLORIDE 5 MILLIGRAM(S): 5 TABLET ORAL at 10:36

## 2019-12-26 RX ADMIN — Medication 1 APPLICATION(S): at 05:44

## 2019-12-26 RX ADMIN — Medication 1 APPLICATION(S): at 17:23

## 2019-12-26 RX ADMIN — OXYCODONE HYDROCHLORIDE 5 MILLIGRAM(S): 5 TABLET ORAL at 03:58

## 2019-12-26 RX ADMIN — GABAPENTIN 300 MILLIGRAM(S): 400 CAPSULE ORAL at 15:04

## 2019-12-26 RX ADMIN — PANTOPRAZOLE SODIUM 40 MILLIGRAM(S): 20 TABLET, DELAYED RELEASE ORAL at 05:41

## 2019-12-26 RX ADMIN — SENNA PLUS 2 TABLET(S): 8.6 TABLET ORAL at 20:04

## 2019-12-26 RX ADMIN — OXYCODONE HYDROCHLORIDE 5 MILLIGRAM(S): 5 TABLET ORAL at 11:00
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
## 2019-12-26 NOTE — CHART NOTE - NSCHARTNOTEFT_GEN_A_CORE
Nutrition Follow Up Note  Hospital Course   (Per Electronic Medical Record):     Source:  Patient [X]  Family Member [X]   Medical Record [X]      Diet:   Regular Diet w/ Thin Liquids  Tolerates Diet Well  No Chewing/Swallowing Difficulties  No Recent Nausea, Vomiting, Diarrhea or Constipation  Consumes % of Meals (as Per Documentation) - States Good PO Intake/Appetite   on Ensure Enlive 8oz PO Daily (Provides 350kcal & 20grams of Protein) - Patient Takes Nutrition Supplement     Enteral/Parenteral Nutrition: Not Applicable    Current Weight: 160lb on 12/20  Obtain New Weight  Obtain Weights Weekly     Pertinent Medications: MEDICATIONS  (STANDING):  BACItracin   Ointment 1 Application(s) Topical every 12 hours  enoxaparin Injectable 40 milliGRAM(s) SubCutaneous daily  gabapentin 300 milliGRAM(s) Oral every 8 hours  pantoprazole    Tablet 40 milliGRAM(s) Oral before breakfast  polyethylene glycol 3350 17 Gram(s) Oral daily  senna 2 Tablet(s) Oral at bedtime    MEDICATIONS  (PRN):  acetaminophen   Tablet .. 650 milliGRAM(s) Oral every 6 hours PRN Mild Pain (1 - 3)  bisacodyl Suppository 10 milliGRAM(s) Rectal daily PRN Constipation  oxyCODONE    IR 5 milliGRAM(s) Oral every 6 hours PRN Severe Pain (7 - 10)    Pertinent Labs:   12-21 Alb 3.5 g/dL    Skin: No Pressure Ulcers  Surgical Incision on Scalp  (as Per Nursing Flow Sheet)     Edema: None Noted      Last Bowel Movement: on 12/23    Estimated Needs:   [X] No Change Since Previous Assessment    Previous Nutrition Diagnosis:   Mild Malnutrition    Nutrition Diagnosis is [X] Ongoing - Continues on Nutrition Supplement & Patient Takes Nutrition Supplement     New Nutrition Diagnosis: [X] Not Applicable    Interventions:   1. Recommend Continue Nutrition Plan of Care     Monitoring & Evaluation:   [X] Weights   [X] PO Intake   [X] Skin Integrity   [X] Follow Up (Per Protocol)  [X] Tolerance to Diet Prescription   [X] Other: Labs     Registered Dietitian/Nutritionist Remains Available.  Lauri Bridges RDN    Pager # [phone]  Phone# [phone]
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
## 2019-12-27 PROCEDURE — 99232 SBSQ HOSP IP/OBS MODERATE 35: CPT

## 2019-12-27 RX ADMIN — ENOXAPARIN SODIUM 40 MILLIGRAM(S): 100 INJECTION SUBCUTANEOUS at 12:44

## 2019-12-27 RX ADMIN — OXYCODONE HYDROCHLORIDE 5 MILLIGRAM(S): 5 TABLET ORAL at 05:38

## 2019-12-27 RX ADMIN — POLYETHYLENE GLYCOL 3350 17 GRAM(S): 17 POWDER, FOR SOLUTION ORAL at 12:44

## 2019-12-27 RX ADMIN — GABAPENTIN 300 MILLIGRAM(S): 400 CAPSULE ORAL at 20:33

## 2019-12-27 RX ADMIN — Medication 650 MILLIGRAM(S): at 21:00

## 2019-12-27 RX ADMIN — PANTOPRAZOLE SODIUM 40 MILLIGRAM(S): 20 TABLET, DELAYED RELEASE ORAL at 05:38

## 2019-12-27 RX ADMIN — Medication 650 MILLIGRAM(S): at 20:33

## 2019-12-27 RX ADMIN — Medication 650 MILLIGRAM(S): at 13:26

## 2019-12-27 RX ADMIN — OXYCODONE HYDROCHLORIDE 5 MILLIGRAM(S): 5 TABLET ORAL at 07:00

## 2019-12-27 RX ADMIN — GABAPENTIN 300 MILLIGRAM(S): 400 CAPSULE ORAL at 05:38

## 2019-12-27 RX ADMIN — GABAPENTIN 300 MILLIGRAM(S): 400 CAPSULE ORAL at 12:44

## 2019-12-27 RX ADMIN — SENNA PLUS 2 TABLET(S): 8.6 TABLET ORAL at 20:33

## 2019-12-27 RX ADMIN — Medication 650 MILLIGRAM(S): at 12:43

## 2019-12-27 NOTE — PROGRESS NOTE ADULT - RS GEN PE MLT RESP DETAILS PC
good air movement/breath sounds equal/respirations non-labored/clear to auscultation bilaterally
respirations non-labored/clear to auscultation bilaterally/breath sounds equal
breath sounds equal/clear to auscultation bilaterally/respirations non-labored
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
## 2019-12-27 NOTE — PROGRESS NOTE ADULT - COMMENTS
Patient seen with assistance of PTA translating in Irish. Has some itching and irritation from right hand splint from staples. No H/A  +Splint in place, no pain in left ear
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
## 2019-12-27 NOTE — PROGRESS NOTE ADULT - EXTREMITIES COMMENTS
no calf swelling +soft, NT no erythema or warmth  right hand fingers II-V wiggle, no swelling or discoloration

## 2019-12-27 NOTE — PROGRESS NOTE ADULT - ENMT COMMENTS
left ear laceration healing well +eschar +suture, no redness or oozing +dried ,c rusted nasal discharge, no rhinitis +splint in place, no local swelling.conjunctive clear. scalp laceration closed, healing

## 2019-12-28 PROCEDURE — 99232 SBSQ HOSP IP/OBS MODERATE 35: CPT

## 2019-12-28 RX ORDER — LIDOCAINE 4 G/100G
1 CREAM TOPICAL DAILY
Refills: 0 | Status: DISCONTINUED | OUTPATIENT
Start: 2019-12-28 | End: 2020-01-03

## 2019-12-28 RX ADMIN — Medication 650 MILLIGRAM(S): at 20:21

## 2019-12-28 RX ADMIN — Medication 1 APPLICATION(S): at 06:01

## 2019-12-28 RX ADMIN — Medication 650 MILLIGRAM(S): at 07:46

## 2019-12-28 RX ADMIN — LIDOCAINE 1 PATCH: 4 CREAM TOPICAL at 23:30

## 2019-12-28 RX ADMIN — GABAPENTIN 300 MILLIGRAM(S): 400 CAPSULE ORAL at 13:20

## 2019-12-28 RX ADMIN — LIDOCAINE 1 PATCH: 4 CREAM TOPICAL at 11:50

## 2019-12-28 RX ADMIN — Medication 650 MILLIGRAM(S): at 21:21

## 2019-12-28 RX ADMIN — GABAPENTIN 300 MILLIGRAM(S): 400 CAPSULE ORAL at 20:20

## 2019-12-28 RX ADMIN — Medication 650 MILLIGRAM(S): at 14:00

## 2019-12-28 RX ADMIN — PANTOPRAZOLE SODIUM 40 MILLIGRAM(S): 20 TABLET, DELAYED RELEASE ORAL at 06:00

## 2019-12-28 RX ADMIN — ENOXAPARIN SODIUM 40 MILLIGRAM(S): 100 INJECTION SUBCUTANEOUS at 11:50

## 2019-12-28 RX ADMIN — LIDOCAINE 1 PATCH: 4 CREAM TOPICAL at 19:30

## 2019-12-28 RX ADMIN — Medication 650 MILLIGRAM(S): at 13:20

## 2019-12-28 RX ADMIN — SENNA PLUS 2 TABLET(S): 8.6 TABLET ORAL at 20:20

## 2019-12-28 RX ADMIN — Medication 650 MILLIGRAM(S): at 06:00

## 2019-12-28 RX ADMIN — Medication 1 APPLICATION(S): at 17:32

## 2019-12-28 RX ADMIN — GABAPENTIN 300 MILLIGRAM(S): 400 CAPSULE ORAL at 06:00

## 2019-12-28 RX ADMIN — POLYETHYLENE GLYCOL 3350 17 GRAM(S): 17 POWDER, FOR SOLUTION ORAL at 11:50
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
## 2019-12-29 PROCEDURE — 99232 SBSQ HOSP IP/OBS MODERATE 35: CPT

## 2019-12-29 RX ADMIN — Medication 650 MILLIGRAM(S): at 12:00

## 2019-12-29 RX ADMIN — LIDOCAINE 1 PATCH: 4 CREAM TOPICAL at 21:17

## 2019-12-29 RX ADMIN — GABAPENTIN 300 MILLIGRAM(S): 400 CAPSULE ORAL at 05:13

## 2019-12-29 RX ADMIN — Medication 650 MILLIGRAM(S): at 22:00

## 2019-12-29 RX ADMIN — GABAPENTIN 300 MILLIGRAM(S): 400 CAPSULE ORAL at 13:11

## 2019-12-29 RX ADMIN — LIDOCAINE 1 PATCH: 4 CREAM TOPICAL at 21:20

## 2019-12-29 RX ADMIN — POLYETHYLENE GLYCOL 3350 17 GRAM(S): 17 POWDER, FOR SOLUTION ORAL at 11:36

## 2019-12-29 RX ADMIN — GABAPENTIN 300 MILLIGRAM(S): 400 CAPSULE ORAL at 21:17

## 2019-12-29 RX ADMIN — LIDOCAINE 1 PATCH: 4 CREAM TOPICAL at 11:36

## 2019-12-29 RX ADMIN — Medication 650 MILLIGRAM(S): at 21:17

## 2019-12-29 RX ADMIN — PANTOPRAZOLE SODIUM 40 MILLIGRAM(S): 20 TABLET, DELAYED RELEASE ORAL at 05:12

## 2019-12-29 RX ADMIN — Medication 1 APPLICATION(S): at 18:04

## 2019-12-29 RX ADMIN — Medication 650 MILLIGRAM(S): at 11:36

## 2019-12-29 RX ADMIN — ENOXAPARIN SODIUM 40 MILLIGRAM(S): 100 INJECTION SUBCUTANEOUS at 11:36

## 2019-12-29 RX ADMIN — SENNA PLUS 2 TABLET(S): 8.6 TABLET ORAL at 21:17

## 2019-12-29 RX ADMIN — Medication 1 APPLICATION(S): at 05:13
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
## 2019-12-30 LAB
ANION GAP SERPL CALC-SCNC: 8 MMOL/L — SIGNIFICANT CHANGE UP (ref 5–17)
BUN SERPL-MCNC: 22 MG/DL — SIGNIFICANT CHANGE UP (ref 7–23)
CALCIUM SERPL-MCNC: 9.3 MG/DL — SIGNIFICANT CHANGE UP (ref 8.4–10.5)
CHLORIDE SERPL-SCNC: 106 MMOL/L — SIGNIFICANT CHANGE UP (ref 96–108)
CO2 SERPL-SCNC: 30 MMOL/L — SIGNIFICANT CHANGE UP (ref 22–31)
CREAT SERPL-MCNC: 1.04 MG/DL — SIGNIFICANT CHANGE UP (ref 0.5–1.3)
GLUCOSE SERPL-MCNC: 83 MG/DL — SIGNIFICANT CHANGE UP (ref 70–99)
HCT VFR BLD CALC: 43.4 % — SIGNIFICANT CHANGE UP (ref 39–50)
HGB BLD-MCNC: 15 G/DL — SIGNIFICANT CHANGE UP (ref 13–17)
MCHC RBC-ENTMCNC: 30.8 PG — SIGNIFICANT CHANGE UP (ref 27–34)
MCHC RBC-ENTMCNC: 34.6 GM/DL — SIGNIFICANT CHANGE UP (ref 32–36)
MCV RBC AUTO: 89.1 FL — SIGNIFICANT CHANGE UP (ref 80–100)
NRBC # BLD: 0 /100 WBCS — SIGNIFICANT CHANGE UP (ref 0–0)
PLATELET # BLD AUTO: 347 K/UL — SIGNIFICANT CHANGE UP (ref 150–400)
POTASSIUM SERPL-MCNC: 3.9 MMOL/L — SIGNIFICANT CHANGE UP (ref 3.5–5.3)
POTASSIUM SERPL-SCNC: 3.9 MMOL/L — SIGNIFICANT CHANGE UP (ref 3.5–5.3)
RBC # BLD: 4.87 M/UL — SIGNIFICANT CHANGE UP (ref 4.2–5.8)
RBC # FLD: 11.4 % — SIGNIFICANT CHANGE UP (ref 10.3–14.5)
SODIUM SERPL-SCNC: 144 MMOL/L — SIGNIFICANT CHANGE UP (ref 135–145)
SURGICAL PATHOLOGY STUDY: SIGNIFICANT CHANGE UP
WBC # BLD: 7.76 K/UL — SIGNIFICANT CHANGE UP (ref 3.8–10.5)
WBC # FLD AUTO: 7.76 K/UL — SIGNIFICANT CHANGE UP (ref 3.8–10.5)

## 2019-12-30 PROCEDURE — 99232 SBSQ HOSP IP/OBS MODERATE 35: CPT

## 2019-12-30 RX ADMIN — LIDOCAINE 1 PATCH: 4 CREAM TOPICAL at 12:33

## 2019-12-30 RX ADMIN — PANTOPRAZOLE SODIUM 40 MILLIGRAM(S): 20 TABLET, DELAYED RELEASE ORAL at 06:26

## 2019-12-30 RX ADMIN — GABAPENTIN 300 MILLIGRAM(S): 400 CAPSULE ORAL at 14:14

## 2019-12-30 RX ADMIN — Medication 650 MILLIGRAM(S): at 06:28

## 2019-12-30 RX ADMIN — SENNA PLUS 2 TABLET(S): 8.6 TABLET ORAL at 21:29

## 2019-12-30 RX ADMIN — LIDOCAINE 1 PATCH: 4 CREAM TOPICAL at 21:33

## 2019-12-30 RX ADMIN — ENOXAPARIN SODIUM 40 MILLIGRAM(S): 100 INJECTION SUBCUTANEOUS at 12:33

## 2019-12-30 RX ADMIN — Medication 650 MILLIGRAM(S): at 22:00

## 2019-12-30 RX ADMIN — GABAPENTIN 300 MILLIGRAM(S): 400 CAPSULE ORAL at 06:25

## 2019-12-30 RX ADMIN — Medication 650 MILLIGRAM(S): at 12:40

## 2019-12-30 RX ADMIN — Medication 650 MILLIGRAM(S): at 13:31

## 2019-12-30 RX ADMIN — Medication 1 APPLICATION(S): at 17:52

## 2019-12-30 RX ADMIN — GABAPENTIN 300 MILLIGRAM(S): 400 CAPSULE ORAL at 21:29

## 2019-12-30 RX ADMIN — Medication 650 MILLIGRAM(S): at 07:00

## 2019-12-30 RX ADMIN — Medication 650 MILLIGRAM(S): at 21:30

## 2019-12-30 RX ADMIN — POLYETHYLENE GLYCOL 3350 17 GRAM(S): 17 POWDER, FOR SOLUTION ORAL at 12:33

## 2019-12-30 RX ADMIN — LIDOCAINE 1 PATCH: 4 CREAM TOPICAL at 18:28

## 2019-12-30 RX ADMIN — Medication 1 APPLICATION(S): at 06:26
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
## 2019-12-31 PROCEDURE — 99232 SBSQ HOSP IP/OBS MODERATE 35: CPT

## 2019-12-31 PROCEDURE — 73110 X-RAY EXAM OF WRIST: CPT | Mod: 26,RT

## 2019-12-31 RX ADMIN — GABAPENTIN 300 MILLIGRAM(S): 400 CAPSULE ORAL at 06:33

## 2019-12-31 RX ADMIN — LIDOCAINE 1 PATCH: 4 CREAM TOPICAL at 19:36

## 2019-12-31 RX ADMIN — ENOXAPARIN SODIUM 40 MILLIGRAM(S): 100 INJECTION SUBCUTANEOUS at 11:54

## 2019-12-31 RX ADMIN — LIDOCAINE 1 PATCH: 4 CREAM TOPICAL at 20:06

## 2019-12-31 RX ADMIN — Medication 650 MILLIGRAM(S): at 20:02

## 2019-12-31 RX ADMIN — SENNA PLUS 2 TABLET(S): 8.6 TABLET ORAL at 20:06

## 2019-12-31 RX ADMIN — Medication 650 MILLIGRAM(S): at 07:00

## 2019-12-31 RX ADMIN — Medication 650 MILLIGRAM(S): at 20:30

## 2019-12-31 RX ADMIN — Medication 650 MILLIGRAM(S): at 06:32

## 2019-12-31 RX ADMIN — PANTOPRAZOLE SODIUM 40 MILLIGRAM(S): 20 TABLET, DELAYED RELEASE ORAL at 06:33

## 2019-12-31 RX ADMIN — GABAPENTIN 300 MILLIGRAM(S): 400 CAPSULE ORAL at 20:06

## 2019-12-31 RX ADMIN — GABAPENTIN 300 MILLIGRAM(S): 400 CAPSULE ORAL at 14:53

## 2019-12-31 RX ADMIN — LIDOCAINE 1 PATCH: 4 CREAM TOPICAL at 11:54

## 2019-12-31 RX ADMIN — Medication 1 APPLICATION(S): at 06:33

## 2019-12-31 RX ADMIN — Medication 1 APPLICATION(S): at 18:32

## 2019-12-31 NOTE — CHART NOTE - NSCHARTNOTEFT_GEN_A_CORE
Nutrition Follow Up Note  Hospital Course   (Per Electronic Medical Record):     Source:  Patient [X]  Medical Record [X]      Diet:   Regular Diet w/ Thin Liquids  Tolerates Diet Well  Consumes % of Meals (as Per Documentation)  on Ensure Enlive 8oz PO Daily (Provides 350kcal & 20grams of Protein)    Enteral/Parenteral Nutrition: Not Applicable    Current Weight: 160lb on 12/20  Obtain New Weight  Obtain Weights Weekly     Pertinent Medications: MEDICATIONS  (STANDING):  BACItracin   Ointment 1 Application(s) Topical every 12 hours  enoxaparin Injectable 40 milliGRAM(s) SubCutaneous daily  gabapentin 300 milliGRAM(s) Oral every 8 hours  lidocaine   Patch 1 Patch Transdermal daily  pantoprazole    Tablet 40 milliGRAM(s) Oral before breakfast  polyethylene glycol 3350 17 Gram(s) Oral daily  senna 2 Tablet(s) Oral at bedtime    MEDICATIONS  (PRN):  acetaminophen   Tablet .. 650 milliGRAM(s) Oral every 6 hours PRN Mild Pain (1 - 3)  bisacodyl Suppository 10 milliGRAM(s) Rectal daily PRN Constipation    Pertinent Labs:  12-30 Na144 mmol/L Glu 83 mg/dL K+ 3.9 mmol/L Cr  1.04 mg/dL BUN 22 mg/dL    Skin: No Pressure Ulcers   Surgical Incision on Scalp  (as Per Nursing Flow Sheet)    Edema: None Noted     Last Bowel Movement: on 12/20    Estimated Needs:   [X] No Change Since Previous Assessment    Previous Nutrition Diagnosis:   Mild Malnutrition     Nutrition Diagnosis is [X] Ongoing - Continues on Nutrition Supplement     New Nutrition Diagnosis: [X] Not Applicable    Interventions:   1. Recommend Continue Nutrition Plan of Care     Monitoring & Evaluation:   [X] Weights   [X] PO Intake   [X] Skin Integrity   [X] Follow Up (Per Protocol)  [X] Tolerance to Diet Prescription   [X] Other: Labs     Registered Dietitian/Nutritionist Remains Available.  Lauri Bridges RDN    Pager # [phone]  Phone# [phone]
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
## 2020-01-01 PROCEDURE — 99232 SBSQ HOSP IP/OBS MODERATE 35: CPT

## 2020-01-01 RX ADMIN — GABAPENTIN 300 MILLIGRAM(S): 400 CAPSULE ORAL at 21:08

## 2020-01-01 RX ADMIN — GABAPENTIN 300 MILLIGRAM(S): 400 CAPSULE ORAL at 13:04

## 2020-01-01 RX ADMIN — LIDOCAINE 1 PATCH: 4 CREAM TOPICAL at 19:00

## 2020-01-01 RX ADMIN — Medication 650 MILLIGRAM(S): at 06:06

## 2020-01-01 RX ADMIN — Medication 650 MILLIGRAM(S): at 21:09

## 2020-01-01 RX ADMIN — ENOXAPARIN SODIUM 40 MILLIGRAM(S): 100 INJECTION SUBCUTANEOUS at 12:29

## 2020-01-01 RX ADMIN — Medication 1 APPLICATION(S): at 17:11

## 2020-01-01 RX ADMIN — SENNA PLUS 2 TABLET(S): 8.6 TABLET ORAL at 21:08

## 2020-01-01 RX ADMIN — Medication 650 MILLIGRAM(S): at 12:30

## 2020-01-01 RX ADMIN — Medication 1 APPLICATION(S): at 06:09

## 2020-01-01 RX ADMIN — Medication 650 MILLIGRAM(S): at 21:45

## 2020-01-01 RX ADMIN — Medication 650 MILLIGRAM(S): at 06:30

## 2020-01-01 RX ADMIN — PANTOPRAZOLE SODIUM 40 MILLIGRAM(S): 20 TABLET, DELAYED RELEASE ORAL at 06:04

## 2020-01-01 RX ADMIN — GABAPENTIN 300 MILLIGRAM(S): 400 CAPSULE ORAL at 06:04

## 2020-01-01 RX ADMIN — LIDOCAINE 1 PATCH: 4 CREAM TOPICAL at 12:29

## 2020-01-01 RX ADMIN — Medication 650 MILLIGRAM(S): at 13:00
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
## 2020-01-02 LAB
ANION GAP SERPL CALC-SCNC: 8 MMOL/L — SIGNIFICANT CHANGE UP (ref 5–17)
BUN SERPL-MCNC: 18 MG/DL — SIGNIFICANT CHANGE UP (ref 7–23)
CALCIUM SERPL-MCNC: 9.4 MG/DL — SIGNIFICANT CHANGE UP (ref 8.4–10.5)
CHLORIDE SERPL-SCNC: 105 MMOL/L — SIGNIFICANT CHANGE UP (ref 96–108)
CO2 SERPL-SCNC: 28 MMOL/L — SIGNIFICANT CHANGE UP (ref 22–31)
CREAT SERPL-MCNC: 0.89 MG/DL — SIGNIFICANT CHANGE UP (ref 0.5–1.3)
GLUCOSE SERPL-MCNC: 84 MG/DL — SIGNIFICANT CHANGE UP (ref 70–99)
HCT VFR BLD CALC: 44 % — SIGNIFICANT CHANGE UP (ref 39–50)
HGB BLD-MCNC: 15.4 G/DL — SIGNIFICANT CHANGE UP (ref 13–17)
MCHC RBC-ENTMCNC: 31.2 PG — SIGNIFICANT CHANGE UP (ref 27–34)
MCHC RBC-ENTMCNC: 35 GM/DL — SIGNIFICANT CHANGE UP (ref 32–36)
MCV RBC AUTO: 89.1 FL — SIGNIFICANT CHANGE UP (ref 80–100)
NRBC # BLD: 0 /100 WBCS — SIGNIFICANT CHANGE UP (ref 0–0)
PLATELET # BLD AUTO: 319 K/UL — SIGNIFICANT CHANGE UP (ref 150–400)
POTASSIUM SERPL-MCNC: 4.1 MMOL/L — SIGNIFICANT CHANGE UP (ref 3.5–5.3)
POTASSIUM SERPL-SCNC: 4.1 MMOL/L — SIGNIFICANT CHANGE UP (ref 3.5–5.3)
RBC # BLD: 4.94 M/UL — SIGNIFICANT CHANGE UP (ref 4.2–5.8)
RBC # FLD: 11.6 % — SIGNIFICANT CHANGE UP (ref 10.3–14.5)
SODIUM SERPL-SCNC: 141 MMOL/L — SIGNIFICANT CHANGE UP (ref 135–145)
WBC # BLD: 6.93 K/UL — SIGNIFICANT CHANGE UP (ref 3.8–10.5)
WBC # FLD AUTO: 6.93 K/UL — SIGNIFICANT CHANGE UP (ref 3.8–10.5)

## 2020-01-02 PROCEDURE — 99232 SBSQ HOSP IP/OBS MODERATE 35: CPT | Mod: 24

## 2020-01-02 RX ADMIN — Medication 650 MILLIGRAM(S): at 08:24

## 2020-01-02 RX ADMIN — Medication 1 APPLICATION(S): at 17:43

## 2020-01-02 RX ADMIN — SENNA PLUS 2 TABLET(S): 8.6 TABLET ORAL at 21:37

## 2020-01-02 RX ADMIN — LIDOCAINE 1 PATCH: 4 CREAM TOPICAL at 11:20

## 2020-01-02 RX ADMIN — Medication 650 MILLIGRAM(S): at 21:37

## 2020-01-02 RX ADMIN — Medication 1 APPLICATION(S): at 05:54

## 2020-01-02 RX ADMIN — Medication 650 MILLIGRAM(S): at 22:00

## 2020-01-02 RX ADMIN — LIDOCAINE 1 PATCH: 4 CREAM TOPICAL at 19:00

## 2020-01-02 RX ADMIN — GABAPENTIN 300 MILLIGRAM(S): 400 CAPSULE ORAL at 21:37

## 2020-01-02 RX ADMIN — GABAPENTIN 300 MILLIGRAM(S): 400 CAPSULE ORAL at 05:54

## 2020-01-02 RX ADMIN — ENOXAPARIN SODIUM 40 MILLIGRAM(S): 100 INJECTION SUBCUTANEOUS at 11:19

## 2020-01-02 RX ADMIN — PANTOPRAZOLE SODIUM 40 MILLIGRAM(S): 20 TABLET, DELAYED RELEASE ORAL at 05:54

## 2020-01-02 RX ADMIN — LIDOCAINE 1 PATCH: 4 CREAM TOPICAL at 00:00

## 2020-01-02 RX ADMIN — GABAPENTIN 300 MILLIGRAM(S): 400 CAPSULE ORAL at 13:12

## 2020-01-02 RX ADMIN — LIDOCAINE 1 PATCH: 4 CREAM TOPICAL at 23:00
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
## 2020-01-03 ENCOUNTER — TRANSCRIPTION ENCOUNTER (OUTPATIENT)
Age: 25
End: 2020-01-03

## 2020-01-03 VITALS
DIASTOLIC BLOOD PRESSURE: 60 MMHG | HEART RATE: 55 BPM | OXYGEN SATURATION: 98 % | SYSTOLIC BLOOD PRESSURE: 100 MMHG | RESPIRATION RATE: 16 BRPM | TEMPERATURE: 98 F

## 2020-01-03 PROCEDURE — 99232 SBSQ HOSP IP/OBS MODERATE 35: CPT

## 2020-01-03 RX ORDER — ACETAMINOPHEN 500 MG
2 TABLET ORAL
Qty: 0 | Refills: 0 | DISCHARGE
Start: 2020-01-03

## 2020-01-03 RX ORDER — GABAPENTIN 400 MG/1
1 CAPSULE ORAL
Qty: 90 | Refills: 0
Start: 2020-01-03 | End: 2020-02-01

## 2020-01-03 RX ADMIN — GABAPENTIN 300 MILLIGRAM(S): 400 CAPSULE ORAL at 06:43

## 2020-01-03 RX ADMIN — Medication 650 MILLIGRAM(S): at 07:49

## 2020-01-03 RX ADMIN — LIDOCAINE 1 PATCH: 4 CREAM TOPICAL at 12:18

## 2020-01-03 RX ADMIN — Medication 650 MILLIGRAM(S): at 13:04

## 2020-01-03 RX ADMIN — Medication 1 APPLICATION(S): at 06:45

## 2020-01-03 RX ADMIN — PANTOPRAZOLE SODIUM 40 MILLIGRAM(S): 20 TABLET, DELAYED RELEASE ORAL at 06:42

## 2020-01-03 RX ADMIN — GABAPENTIN 300 MILLIGRAM(S): 400 CAPSULE ORAL at 13:04

## 2020-01-03 RX ADMIN — Medication 650 MILLIGRAM(S): at 06:44

## 2020-01-03 RX ADMIN — ENOXAPARIN SODIUM 40 MILLIGRAM(S): 100 INJECTION SUBCUTANEOUS at 12:16

## 2020-01-03 NOTE — DISCHARGE NOTE PROVIDER - NSDCMRMEDTOKEN_GEN_ALL_CORE_FT
acetaminophen 325 mg oral tablet: 2 tab(s) orally every 6 hours, As needed, Mild Pain (1 - 3)  gabapentin 300 mg oral capsule: 1 cap(s) orally every 8 hours acetaminophen 325 mg oral tablet: 2 tab(s) orally every 6 hours, As needed, Mild Pain (1 - 3)  gabapentin 300 mg oral capsule: 1 cap(s) orally every 8 hours  Occupational therapy: Occupational therapy twice a week for 6 weeks. Gait, ambulation, ROM, stretching, NWB Right wrist.   Physical therapy: Physical therapy twice a week for 6 weeks. Gait, ambulation, ROM, stretching, NWB Right wrist.
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
## 2020-01-03 NOTE — DISCHARGE NOTE NURSING/CASE MANAGEMENT/SOCIAL WORK - NSDCPEPTSTRK_GEN_ALL_CORE
Stroke support groups for patients, families, and friends/Need for follow up after discharge/Stroke warning signs and symptoms/Call 911 for stroke/Stroke education booklet/Signs and symptoms of stroke/Prescribed medications/Risk factors for stroke

## 2020-01-03 NOTE — DISCHARGE NOTE NURSING/CASE MANAGEMENT/SOCIAL WORK - PATIENT PORTAL LINK FT
You can access the FollowMyHealth Patient Portal offered by [facility] by registering at the following website: [email or web address]. By joining [facility]’s FollowMyHealth portal, you will also be able to view your health information using other applications (apps) compatible with our system.

## 2020-01-03 NOTE — DISCHARGE NOTE PROVIDER - NSDCFUADDINST_GEN_ALL_CORE_FT
Please follow up with Orthopedic Surgeon Dr. Masood Luna within 1 week of discharge  Please follow up with Plastic Surgery Dr. Vanessa Tatum within 1 week of discharge  Please follow up with Dr. Odette Griggs (Rehab) in 1 month from discharge.  Please follow up with your primary care doctor- will likely be set up for clinic appointment

## 2020-01-03 NOTE — DISCHARGE NOTE PROVIDER - PROVIDER TOKENS
PROVIDER:[TOKEN:[[id number]]],PROVIDER:[TOKEN:[[id number]]],PROVIDER:[TOKEN:[[id number]]],FREE:[LAST:[Primary Care Provider],PHONE:[(   )    -],FAX:[(   )    -],ADDRESS:[Primary Care Provider  [facility]]] PROVIDER:[TOKEN:[[id number]],SCHEDULEDAPPT:[01/07/2020],SCHEDULEDAPPTTIME:[03:30 AM]],PROVIDER:[TOKEN:[[id number]]],PROVIDER:[TOKEN:[[id number]]],FREE:[LAST:[Primary Care Provider],PHONE:[(   )    -],FAX:[(   )    -],ADDRESS:[Primary Care Provider  [facility]]]

## 2020-01-03 NOTE — DISCHARGE NOTE PROVIDER - CARE PROVIDERS DIRECT ADDRESSES
,DirectAddress_Unknown,DirectAddress_Unknown,[email or web address],DirectAddress_Unknown

## 2020-01-03 NOTE — DISCHARGE NOTE NURSING/CASE MANAGEMENT/SOCIAL WORK - NSDCFUADDAPPT_GEN_ALL_CORE_FT
APPOINTMENT WITH NEW PRIMARY CARE DOCTOR AT [facility] Dr. FAYE on Friday, 1/10/20 @ 9:15 am.      [facility] OUTPATIENT PHYSICAL MEDICINE REHABILITATION.  [address] [place] [address] [phone]

## 2020-01-03 NOTE — DISCHARGE NOTE PROVIDER - CARE PROVIDER_API CALL
Masood Luna)  Plastic Surgery; Surgery of the Hand  [address], [address]  [place] [address]  Phone: [phone]  Fax: [phone]  Follow Up Time:     Vanessa Tatum)  Plastic Surgery  [address] [place] [address]  Phone: [phone]  Fax: [phone]  Follow Up Time:     Odette Griggs)  Brain Injury Medicine; PhysicalRehab Medicine  [address] [place] [address]  Phone: [phone]  Fax: [phone]  Follow Up Time:     Primary Care Provider,   Primary Care Provider  [facility]  Phone: (   )    -  Fax: (   )    -  Follow Up Time: Masood Luna)  Plastic Surgery; Surgery of the Hand  [address] [facility], [address]  [place] [address]  Phone: [phone]  Fax: [phone]  Scheduled Appointment: 01/07/2020 03:30 AM    Vanessa Tatum)  Plastic Surgery  [address] [place] [address]  Phone: [phone]  Fax: [phone]  Follow Up Time:     Odette Griggs)  Brain Injury Medicine; PhysicalRehab Medicine  [address] [place] [address]  Phone: [phone]  Fax: [phone]  Follow Up Time:     Primary Care Provider,   Primary Care Provider  [facility]  Phone: (   )    -  Fax: (   )    -  Follow Up Time:
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
## 2020-01-03 NOTE — DISCHARGE NOTE PROVIDER - NSDCCPCAREPLAN_GEN_ALL_CORE_FT
PRINCIPAL DISCHARGE DIAGNOSIS  Diagnosis: Skull fracture  Assessment and Plan of Treatment: Pt may resume diet as tolerated, activity as tolerated per Rehab.  Follow up with Neurosurgery 2 weeks from discharge. FINISH antibiotics until completion.  Patient is advised to RETURN TO THE EMERGENCY DEPARTMENT for any of the following - worsening pain, fever/chills, nausea/vomiting, altered mental status, chest pain, shortness of breath, or any other new / worsening symptom.        SECONDARY DISCHARGE DIAGNOSES  Diagnosis: Cerebral hemorrhage  Assessment and Plan of Treatment:     Diagnosis: Scaphoid fracture  Assessment and Plan of Treatment: Follow all verbal and written instructions. Take medications as prescribed. DO NOT drive, operate machinery, and/or make important decisions while on prescription pain medication. DO NOT hesitate to call Doctor's office with questions or concerns.   * Office follow-up in one week  * continue use of splint / brace until follow-up at ALL TIMES  * No strenuous or heavy lifting with right hand    Diagnosis: Closed fracture nasal bone  Assessment and Plan of Treatment: Keep head of bed elevated when laying down.  Follow up with Dr Tatum next week.  Call to make an appointment.

## 2020-01-05 PROCEDURE — 80053 COMPREHEN METABOLIC PANEL: CPT

## 2020-01-05 PROCEDURE — 92523 SPEECH SOUND LANG COMPREHEN: CPT

## 2020-01-05 PROCEDURE — 97116 GAIT TRAINING THERAPY: CPT

## 2020-01-05 PROCEDURE — 97530 THERAPEUTIC ACTIVITIES: CPT

## 2020-01-05 PROCEDURE — 92610 EVALUATE SWALLOWING FUNCTION: CPT

## 2020-01-05 PROCEDURE — 97163 PT EVAL HIGH COMPLEX 45 MIN: CPT

## 2020-01-05 PROCEDURE — 85027 COMPLETE CBC AUTOMATED: CPT

## 2020-01-05 PROCEDURE — 80048 BASIC METABOLIC PNL TOTAL CA: CPT

## 2020-01-05 PROCEDURE — 73110 X-RAY EXAM OF WRIST: CPT

## 2020-01-05 PROCEDURE — 97167 OT EVAL HIGH COMPLEX 60 MIN: CPT

## 2020-01-05 PROCEDURE — 97760 ORTHOTIC MGMT&TRAING 1ST ENC: CPT

## 2020-01-05 PROCEDURE — 97112 NEUROMUSCULAR REEDUCATION: CPT

## 2020-01-05 PROCEDURE — 97110 THERAPEUTIC EXERCISES: CPT

## 2020-01-05 PROCEDURE — 97535 SELF CARE MNGMENT TRAINING: CPT

## 2020-01-05 PROCEDURE — 73090 X-RAY EXAM OF FOREARM: CPT

## 2020-01-21 NOTE — PHYSICAL THERAPY INITIAL EVALUATION ADULT - CLONUS, LEFT
absent Split-Thickness Skin Graft Text: The defect edges were debeveled with a #15 scalpel blade.  Given the location of the defect, shape of the defect and the proximity to free margins a split thickness skin graft was deemed most appropriate.  Using a sterile surgical marker, the primary defect shape was transferred to the donor site. The split thickness graft was then harvested.  The skin graft was then placed in the primary defect and oriented appropriately.

## 2020-09-17 ENCOUNTER — EMERGENCY (EMERGENCY)
Facility: HOSPITAL | Age: 25
LOS: 1 days | Discharge: HOME | End: 2020-09-17
Attending: STUDENT IN AN ORGANIZED HEALTH CARE EDUCATION/TRAINING PROGRAM
Payer: COMMERCIAL

## 2020-09-17 VITALS
TEMPERATURE: 98 F | HEIGHT: 63.78 IN | SYSTOLIC BLOOD PRESSURE: 112 MMHG | OXYGEN SATURATION: 99 % | RESPIRATION RATE: 20 BRPM | HEART RATE: 65 BPM | DIASTOLIC BLOOD PRESSURE: 73 MMHG

## 2020-09-17 DIAGNOSIS — Z98.890 OTHER SPECIFIED POSTPROCEDURAL STATES: Chronic | ICD-10-CM

## 2020-09-17 PROCEDURE — 70450 CT HEAD/BRAIN W/O DYE: CPT

## 2020-09-17 PROCEDURE — 70486 CT MAXILLOFACIAL W/O DYE: CPT | Mod: 26

## 2020-09-17 PROCEDURE — 99285 EMERGENCY DEPT VISIT HI MDM: CPT

## 2020-09-17 PROCEDURE — 70450 CT HEAD/BRAIN W/O DYE: CPT | Mod: 26

## 2020-09-17 PROCEDURE — 70486 CT MAXILLOFACIAL W/O DYE: CPT

## 2020-09-17 NOTE — ED PROVIDER NOTE - NS ED ROS FT
Review of Systems:  	•	CONSTITUTIONAL - no fever, no diaphoresis, no weight change  	•	SKIN - +abrasions, no rash  	•	HEMATOLOGIC - no bleeding, no bruising  	•	EYES - no eye pain, no blurred vision  	•	ENT - no change in hearing, no pain  	•	RESPIRATORY - no shortness of breath, no cough  	•	CARDIAC - no chest pain, no palpitations  	•	GI - no abd pain, no nausea, no vomiting, no diarrhea, no constipation, no bleeding  	•	GENITO-URINARY -  no discharge, no dysuria; no hematuria  	•	ENDO - no polydypsia, no polyurea, no heat/no cold intolerance  	•	MUSCULOSKELETAL - no joint paint, no swelling, no redness  	•	NEUROLOGIC - no weakness, + headache, no anesthesia, no paresthesias  	•	PSYCH - no anxiety, non suicidal, non homicidal, no hallucination, no depression  	•	ALLERGY - no rhinitis

## 2020-09-17 NOTE — ED PROVIDER NOTE - PATIENT PORTAL LINK FT
You can access the FollowMyHealth Patient Portal offered by [facility] by registering at the following website: [email or web address]. By joining [employer]’s FollowMyHealth portal, you will also be able to view your health information using other applications (apps) compatible with our system.

## 2020-09-17 NOTE — ED PROVIDER NOTE - PHYSICAL EXAMINATION
Const: Awake, alert and oriented.   HEENT: Hematoma with superficial abrasions to the forehead, abrasions at scalp, dried blood at nares, no active bleeding no evidence of septal hematom. Moist mucous membranes. No hemotympanum/raccoon eyes/gray sign  Eyes: No scleral icterus. EOMI.  Neck:. Soft and supple. Full ROM without pain.  Cardiac: Regular rate and regular rhythm. +S1/S2. Peripheral pulses 2+ and symmetric. No LE edema.  Resp: Speaking in full sentences. No evidence of respiratory distress. No wheezes, rales or rhonchi.  Abd: Soft, non-tender, non-distended. Normal bowel sounds in all 4 quadrants. No guarding or rebound.  Back: Spine midline and non-tender. No CVAT.  Skin: mulitple abrasion to face and scalp  Lymph: No cervical lymphadenopathy.  Neuro: Awake, alert & oriented x 3. Moves all extremities symmetrically.

## 2020-09-17 NOTE — ED PROVIDER NOTE - NSFOLLOWUPINSTRUCTIONS_ED_ALL_ED_FT
Lesión en la chico, en adultos  Head Injury, Adult    Hay muchos tipos de lesiones en la chico. Pueden ser tan leves brittany un chichón o pueden ser más graves. Algunas de las lesiones graves en la chico son:    Lesión que provoque un impacto en el cerebro (conmoción cerebral).  Hematoma en el cerebro (contusión). [place] significa que hay hemorragia en el cerebro que puede causar un edema.  Fisura en el cráneo (fractura de cráneo).  Hemorragia en el cerebro que se acumula, se coagula y forma ladarius protuberancia (hematoma).    Después de ladarius lesión en la chico, es posible que deba estar en observación jesus un tiempo en el servicio de emergencias. También puede ser necesario hospitalizarlo.    Después de que ocurre ladarius lesión en la chico, la mayoría de los problemas ocurre dentro de las primeras 24 horas, isaac los efectos secundarios pueden aparecer entre 7 y 10 días después de la lesión. Es importante controlar rousseau afección para patel si hay cambios.     ¿Cuáles son las causas?  Hay muchas causas posibles de ladarisu lesión en la chico. Ladarius lesión grave en la chico puede ocurrirle a alguien que tuvo un accidente automovilístico (colisión en un vehículo de motor). Otras causas de lesiones importantes en la chico incluyen accidentes en bicicleta o motocicleta, lesiones deportivas y caídas.    Factores de riesgo  Es más probable que esta afección se manifiesta en personas que:    Beben mucho alcohol o usan drogas ilícitas.  Tienen más de 65 años de edad.  Están en riesgo de sufrir caídas.    ¿Cuáles son los síntomas?  Hay muchos síntomas posibles de ladarius lesión en la chico. Los síntomas visibles incluyen un luda un chichón o ladarius hemorragia en el lugar de la lesión. Otros síntomas no visibles incluyen:    Somnolencia o no poder mantenerse despierto.  Desmayo.  Dolor de chico.  Convulsiones.  Mareos.  Confusión.  Problemas de memoria.  Náuseas o vómitos.    Otros síntomas posibles que pueden aparecer después de la lesión en la chico incluyen los siguientes:    Falta de atención y concentración.  Fatiga o cansarse fácilmente.  Irritabilidad.  Sentir incomodidad cerca de luces brillantes o ruidos aide.  Ansiedad o depresión.  Trastornos del sueño.    ¿Cómo se diagnostica?  Esta afección suele diagnosticarse en función de los síntomas, de ladarius descripción de la lesión y de un examen físico. También pueden hacerle estudios de diagnóstico por imágenes, brittany ladarius resonancia magnética (RM) o ladarius exploración por tomografía computarizada (TC). Lo controlarán estrictamente.    ¿Cómo se trata?  El tratamiento de esta afección depende de la gravedad y el tipo de lesión que sufrió. El objetivo principal del tratamiento es prevenir complicaciones y darle tiempo al cerebro para que se recupere.    En el ana paula de ladarius lesión leve en la chico, es posible que lo envíen a casa, y el tratamiento puede incluir lo siguiente:    Observación. Un adulto responsable debe quedarse con usted jesus 24 horas después de producida la lesión y controlarlo con frecuencia.  Partridge físico.  Partridge cerebral.  Analgésicos.    En el ana paula de ladarius lesión grave en la chico, el tratamiento puede incluir lo siguiente:    Observación minuciosa. [place] incluye hospitalización con exámenes físicos frecuentes. Puede necesitar ir a un hospital que se especialice en lesiones en la chico.  Analgésicos.  Asistencia respiratoria. [place] puede incluir un respirador.  Control de la presión dentro del cerebro (presión intracraneal o PIC). Estos pueden incluir los siguientes:  Monitoreo de la PIC.  Administrar medicamentos para disminuir la PIC.  Cambiar rousseau posición para disminuir la PIC.  Medicamentos para prevenir convulsiones.  Cirugía para detener la hemorragia o extraer coágulos de beverly (craneotomía).  Cirugía para extraer parte del cráneo (craneotomía descompresiva). [place] permite que el cerebro tenga lugar para hincharse.    Siga estas indicaciones en rousseau casa:  Actividad    Descanse todo lo posible y evite actividades que wilmer físicamente difíciles o agotadoras.  Asegúrese de dormir lo suficiente.  Limite las actividades que requieran pensar mucho o prestar atención, brittany las siguientes:  Mirar televisión.  Jugar juegos de memoria y armar rompecabezas.  Tareas para el hogar o trabajos relacionados con el empleo.  Trabajar en la computadora, participar en redes sociales y enviar mensajes de texto.  Evite actividades que puedan causar otra lesión en la chico, brittany practicar deportes, hasta que el médico lo autorice. Puede ser peligroso tener otra lesión en la chico antes de que se haya recuperado de la primera.  Pregúntele al médico cuándo puede retomar ricardo actividades habituales, incluidos el trabajo o el estudio. Pídale al médico un plan escalonado para retomar ricardo actividades de forma gradual.  Consulte a rousseau médico sobre cuándo puede conducir, andar en bicicleta o usar maquinaria pesada. La capacidad para reaccionar puede ser más lenta luego de ladarius lesión cerebral. Nunca realice estas actividades si se siente mareado.    Estilo de cherry    No bev alcohol hasta que el médico lo autorice y evite el consumo de drogas. El alcohol y ciertas drogas pueden demorar orusseau recuperación y ponerlo en riesgo de nuevas lesiones.  Si le resulta más difícil que lo habitual recordar las cosas, escríbalas.  Trate de hacer ladarius cosa por vez si se distrae con facilidad.  Consulte con familiares y amigos si debe nick decisiones importantes.  Cuénteles sobre rousseau lesión, los síntomas y las restricciones a ricardo amigos, a rousseau otto, a un colega de confianza y a rousseau gerente en el trabajo. Pídales que observen si aparecen nuevos problemas o empeoran los existentes.    Instrucciones generales    [employer] los medicamentos de venta lalit y los recetados solamente brittany se lo haya indicado el médico.  Pídale a alguien que lo acompañe jesus 24 horas después de la lesión en la chico. Esta persona debe observar cambios en los síntomas y estar preparada para obtener ayuda médica, si es necesario.  Concurra a todas las visitas de control brittany se lo haya indicado el médico. [place] es importante.    ¿Cómo se mele?  Trabaje para mejorar el equilibrio y la fuerza a fin de evitar caídas.  Use el cinturón de seguridad cuando se encuentre en un vehículo en movimiento.  Use un izabella al andar en bicicleta, esquiar o practicar cualquier otro deporte o actividad que tenga riesgo de lesiones.  Bev alcohol solo con moderación.  [employer] medidas de seguridad en rousseau hogar, por ejemplo:  Mantenga los pisos y las escaleras en orden.  Coloque barras para sostén en los shannon y pasamanos en las escaleras.  Ponga alfombras antideslizantes en pisos y bañeras.  Mejore la iluminación en zonas de penumbra.    Solicite ayuda de inmediato si:  Tiene los siguientes síntomas:  Dolor de chico intenso que no desaparece con los medicamentos.  Dificultad para caminar, debilidad en los brazos o las piernas, o pérdida del equilibrio.  Secreción transparente o con beverly que proviene de la nariz o de los oídos.  Cambios en la visión.  Convulsiones.  Vomita.  Los síntomas empeoran.  Arrastra las palabras.  Se desmaya.  Está más somnoliento o tiene dificultad para mantenerse despierto.  Las pupilas cambian de tamaño.    Estos síntomas pueden representar un problema grave que constituye ladarius emergencia. No espere hasta que los síntomas desaparezcan. Solicite atención médica de inmediato. Comuníquese con el servicio de emergencias de rousseau localidad (911 en los [place]). No conduzca por ricardo propios medios hasta el hospital.    Por favor tenga seguimiento con rousseau doctor primario entre 2 ordoñez.  Regrese a urgencias por cualquier preocupacion medica.
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
## 2020-11-23 NOTE — PRE-OP CHECKLIST - AS BP NONINV SITE
left upper arm Rituxan Counseling:  I discussed with the patient the risks of Rituxan infusions. Side effects can include infusion reactions, severe drug rashes including mucocutaneous reactions, reactivation of latent hepatitis and other infections and rarely progressive multifocal leukoencephalopathy.  All of the patient's questions and concerns were addressed.

## 2021-11-04 NOTE — PATIENT PROFILE ADULT - PUBLIC BENEFITS
1930 - Bedside shift change report given to Santo Harada (oncoming nurse) by [id number] Pepe Ansari (offgoing nurse). Report included the following information SBAR, MAR and Recent Results. 2000 - Verified orders for cytotec with Dana Cosme CNM. 50mcg cytotec at 2300   25 mcg at 0300    start pitocin at 0700    0735 Per Diaz CNM at bedside discussing plan of care, reviewed FHT ok to continue to increase pitocin by 2 q 30min.  Will come around lunch time to assess and do cervical exam. no

## 2022-07-09 NOTE — ED ADULT TRIAGE NOTE - NS ED TRIAGE AVPU SCALE
Painful - The patient does not respond to voice, but does respond to a painful stimulus, such as a squeeze to the hand or sternal rub. A noxious stimulous is needed to elicit a response.
bleeding

## 2022-07-30 NOTE — ED PROVIDER NOTE - NSDCPRINTRESULTS_ED_ALL_ED
Subjective   Patient ID: Ioana is a 46 year old female.    Chief Complaint   Patient presents with   • Ear Pain     X2 weeks  Bilateral ear pain  Itchy , clogged , slight hearing loss from clogged ear,  and painful  Pain  is 7/10 ( now)   Pt use oil for wax softener to clean her ears   Pt use Aleve for pain relief       C/o b/l ear pain x 2 weeks  Feels itchy   No f,c, ear drainage, dizziness, URI symptoms  Took aleve with mild relief          Fully vaccinated against Covid: 2 doses      Ioana has a current medication list which includes the following prescription(s): pantoprazole.    Ioana has No Known Allergies.    Review of Systems   HENT: Positive for ear pain.    All other systems reviewed and are negative.      Objective   Physical Exam  Vitals reviewed.   Constitutional:       General: She is not in acute distress.     Appearance: Normal appearance. She is normal weight. She is not ill-appearing or toxic-appearing.   HENT:      Head: Normocephalic.      Right Ear: Tympanic membrane, ear canal and external ear normal. There is no impacted cerumen.      Left Ear: Tympanic membrane, ear canal and external ear normal. There is no impacted cerumen.      Nose: Congestion present.      Right Turbinates: Enlarged.      Left Turbinates: Enlarged.      Right Sinus: No maxillary sinus tenderness or frontal sinus tenderness.      Left Sinus: No maxillary sinus tenderness or frontal sinus tenderness.      Mouth/Throat:      Mouth: Mucous membranes are moist.      Pharynx: Oropharynx is clear. No oropharyngeal exudate or posterior oropharyngeal erythema.   Cardiovascular:      Rate and Rhythm: Normal rate and regular rhythm.      Pulses: Normal pulses.      Heart sounds: Normal heart sounds. No murmur heard.  Pulmonary:      Effort: Pulmonary effort is normal.      Breath sounds: Normal breath sounds. No wheezing or rhonchi.   Musculoskeletal:         General: Normal range of motion.      Cervical back: Normal range of motion  and neck supple. No rigidity or tenderness.   Lymphadenopathy:      Cervical: No cervical adenopathy.   Neurological:      Mental Status: She is alert and oriented to person, place, and time.         Assessment   Problem List Items Addressed This Visit    None     Visit Diagnoses     Otalgia of both ears    -  Primary          Patient Instructions   Start Flonase nasal spray daily  Stay hydrated  Monitor for worsening symptoms  F/u with PCP           Patient requests all Lab and Radiology Results on their Discharge Instructions

## 2023-01-25 NOTE — PRE-OP CHECKLIST - HEIGHT IN CM
162 Area H Indication Text: Tumors in this location are included in Area H (eyelids, eyebrows, nose, lips, chin, ear, pre-auricular, post-auricular, temple, genitalia, hands, feet, ankles and areola).  Tissue conservation is critical in these anatomic locations.

## 2023-09-30 NOTE — PATIENT PROFILE ADULT - BRADEN [employer]
Service: General Internal Medicine     Subjective Data:   CHRISTINA THAYER is a 85 year old Male who is Hospital Day # 3.     Patient seen and examined  More verbal today and interacts with conversation  Alert to self  Was pulling at his IV but redirected him and he stopped  Has no complaints, but is confused, hx dementia, alert and oriented x2 baseline.    Objective Data:     Objective Information:      T   P  R  BP   MAP  SpO2   Value  36.1  80  17  165/74      99%  Date/Time 9/28 8:00 9/28 8:00 9/28 8:00 9/28 8:00    9/28 8:00  Range  (36.1C - 36.6C )  (52 - 94 )  (17 - 24 )  (123 - 183 )/ (59 - 79 )    (95% - 99% )      Pain reported at 9/27 8:00: 0 = None    Physical Exam by System     Constitutional: Well developed, awake/alert/confused,  NAD, hx dementia   ENMT: mucous membranes moist, no apparent injury,  no lesions seen   Respiratory/Thorax: Patent airways, CTAB, normal  breath sounds with good chest expansion, thorax symmetric   Cardiovascular: Regular, rate and rhythm, no murmurs,  2+ equal pulses of the extremities, normal S 1and S 2   Gastrointestinal: Nondistended, soft, non-tender,  no rebound tenderness or guarding, no masses palpable, no organomegaly, +BS, no bruits   Genitourinary: Carter-clear yellow   Extremities: multiple lacerations to bilateral knees  and legs, scrape noticed to left back   Neurological: alert and confused. more verbal than  yesterday, follows commands   Psychological: Appropriate mood and behavior     Medication     Medications:          Continuous Medications       --------------------------------    1. Lactated Ringers Infusion:  1000  mL  IntraVenous  <Continuous>         Scheduled Medications       --------------------------------    1. Aspirin Enteric Coated:  81  mg  Oral  Daily    2. Carvedilol:  6.25  mg  Oral  2 Times a Day    3. Docusate:  100  mg  Oral  2 Times a Day    4. Famotidine:  20  mg  Oral  Daily    5. Heparin SubCutaneous:  5000  unit(s)   SubCutaneous  Every 8 Hours    6. Insulin Lispro Mild Corrective Scale:  unit(s)  SubCutaneous  4 Times a Day Insulin Timing    7. Piperacillin - Tazobactam 3.375 gram/Iso-osmotic 50 mL Premix IVPB:  50  mL  IntraVenous Piggyback  Every 8 Hours    8. Rosuvastatin:  20  mg  Oral  Daily    9. Vancomycin - RPh to Dose - IV Piggy Back:  1  each  As Specified  Variable         PRN Medications       --------------------------------    1. Acetaminophen:  650  mg  Oral  Every 4 Hours    2. Acetaminophen:  650  mg  Oral  Every 4 Hours    3. Dextrose 50% in Water Injectable:  25  gram(s)  IntraVenous Push  Every 15 Minutes    4. Glucagon Injectable:  1  mg  IntraMuscular  Every 15 Minutes    5. Lidocaine 2% (UROJET) Topical Gel:  5  mL  Topical  Every 4 Hours    6. Ondansetron Injectable:  4  mg  IntraVenous Push  Every 4 Hours    7. Sodium Chloride 0.9% Injectable Flush:  10  mL  IntraVenous Flush  Every 8 Hours and as Needed           Currently Suspended Medications       --------------------------------    1. Losartan:  25  mg  Oral  Daily      Recent Lab Results     Results:        I have reviewed these laboratory results:    Complete Blood Count  28-Sep-2023 05:45:00      Result Value    White Blood Cell Count  7.4    Nucleated Erythrocyte Count  0.0    Red Blood Cell Count  3.04   L   HGB  9.9   L   HCT  30.1   L   MCV  99    MCHC  32.9    PLT  204    RDW-CV  14.4      Basic Metabolic Panel  28-Sep-2023 05:45:00      Result Value    Glucose, Serum  120   H   NA  138    K  3.7    CL  106    Bicarbonate, Serum  24    Anion Gap, Serum  12    BUN  28   H   CREAT  1.63   H   GFR Male  40   A   Calcium, Serum  9.2      Magnesium, Serum  28-Sep-2023 05:45:00      Result Value    Magnesium, Serum  1.79        Radiology Results     Results:        Impression:    Mild DJD in the thoracic spine and both shoulders. Otherwise,  negative exam.     Xray Chest 1 View [Sep 26 2023 11:33AM]      Assessment and Plan:   Daily Risk Screen    ·  Does patient have an indwelling urinary catheter? yes   ·  Plan for indwelling urinary catheter removal today? no   ·  The patient continues to require indwelling urinary catheterization for urinary retention/bladder outlet obstruction, acute or chronic     Code Status   ·  Code Status DNAR with Added Limitations     Assessment:      Impression:    Acute encephalopathy likely related to acute urinary tract infection  hx dementia, alert and oriented x2 at baseline  hx CKD 3  hx T2DM  hx hypertension  hx severe stenosis distal LAD  hx NSTEMI    Plan:    cont tele  cont Zosyn  cont vancomycin  blood cultures collected, 1/2 + coag negative staph, likely contam, ordered repeat blood cultures today  Urine culture enteric bacilli  MRSA swab ordered  diabetic diet, insulin sliding scale  review of [facility] records patient renal function 8/17/23 Cr 2.46 BUN 49, GFR 29. also  3/2023 Creatine 1.84, BUN 27 GFR 35  renal function has improved since yesterday Cr 2.42 today 1.63  cont LR at 75 ml hour  Resume appropriate patient home medications-resume losartan tomorrow if renal function remains stable  am labs including cbc, bmp, mag  pt ot consulted  time spent > 30 minutes  POC discussed with patient and attending  Dispo: will likely remain hospitalized over the weekend to monitor cultures      [address] -- he has a signed dnrcca in his chart from the nursing home      Nutrition Diagnosis   ·  Nutrition Diagnosis      Agree with dietitian?s assessment and diagnoses as stated.  A new diagnosis of Moderate malnutrition related to starvation related to dementia  as evidence  by prolonged poor intakes at assisted living facility of consistently <50% of meals over the past 2 months with significant weight loss of 52# in 2 months..    Electronic Signatures for Addendum Section:   Gwen Polk (DO) (Signed Addendum 28-Sep-2023 16:58)   I did not see patient at bedside, but did chart review and staff patient with ASHLEY who saw and  examined patient.  Continue antibiotics for urinary tract infection  suspected to be complicated given MARIAH and purulence.  Follow-up urine culture.  Will de-escalate antibiotics.  Discussed with ASHLEY.     Electronic Signatures:  Ashlyn Short (APRN-CNP)  (Signed 28-Sep-2023 11:43)   Authored: Service, Subjective Data, Objective Data, Assessment  and Plan, Note Completion      Last Updated: 28-Sep-2023 16:58 by Gwen Polk (DO)    (3) slightly limited

## 2024-01-08 NOTE — PHYSICAL THERAPY INITIAL EVALUATION ADULT - GROSSLY INTACT, SENSORY
MIGUELOM for pt to call back for message from  SHAKIRA.  The call was sent out to make sure who is managing his INR.    
see above
Grossly Intact/grossly intact to light tough; however pt reports Right LE numbness
